# Patient Record
Sex: FEMALE | Race: BLACK OR AFRICAN AMERICAN | NOT HISPANIC OR LATINO | Employment: OTHER | URBAN - METROPOLITAN AREA
[De-identification: names, ages, dates, MRNs, and addresses within clinical notes are randomized per-mention and may not be internally consistent; named-entity substitution may affect disease eponyms.]

---

## 2017-02-13 ENCOUNTER — OFFICE VISIT (OUTPATIENT)
Dept: GASTROENTEROLOGY | Facility: CLINIC | Age: 42
End: 2017-02-13
Payer: MEDICAID

## 2017-02-13 VITALS
HEIGHT: 64 IN | SYSTOLIC BLOOD PRESSURE: 137 MMHG | WEIGHT: 237 LBS | DIASTOLIC BLOOD PRESSURE: 62 MMHG | BODY MASS INDEX: 40.46 KG/M2

## 2017-02-13 DIAGNOSIS — K31.84 GASTROPARESIS: Primary | ICD-10-CM

## 2017-02-13 DIAGNOSIS — K59.09 OTHER CONSTIPATION: ICD-10-CM

## 2017-02-13 PROCEDURE — 99212 OFFICE O/P EST SF 10 MIN: CPT | Mod: PBBFAC,PN | Performed by: INTERNAL MEDICINE

## 2017-02-13 PROCEDURE — 99999 PR PBB SHADOW E&M-EST. PATIENT-LVL II: CPT | Mod: PBBFAC,,, | Performed by: INTERNAL MEDICINE

## 2017-02-13 PROCEDURE — 99214 OFFICE O/P EST MOD 30 MIN: CPT | Mod: S$PBB,,, | Performed by: INTERNAL MEDICINE

## 2017-02-13 RX ORDER — LIDOCAINE 50 MG/G
OINTMENT TOPICAL
Refills: 0 | COMMUNITY
Start: 2016-12-29 | End: 2018-01-11

## 2017-02-13 RX ORDER — TRAZODONE HYDROCHLORIDE 50 MG/1
TABLET ORAL
COMMUNITY
Start: 2016-12-06 | End: 2018-01-11

## 2017-02-13 RX ORDER — CLOBETASOL PROPIONATE 0.5 MG/G
CREAM TOPICAL
Refills: 0 | COMMUNITY
Start: 2016-12-29 | End: 2018-01-11

## 2017-02-13 RX ORDER — NIFEDIPINE 30 MG/1
TABLET, EXTENDED RELEASE ORAL
COMMUNITY
Start: 2017-02-01 | End: 2018-01-11

## 2017-02-13 RX ORDER — HYDROCODONE BITARTRATE AND ACETAMINOPHEN 7.5; 325 MG/1; MG/1
TABLET ORAL
COMMUNITY
Start: 2017-01-16 | End: 2018-01-11

## 2017-02-13 RX ORDER — METOCLOPRAMIDE 10 MG/1
10 TABLET ORAL
Qty: 120 TABLET | Refills: 1 | Status: SHIPPED | OUTPATIENT
Start: 2017-02-13 | End: 2017-03-15

## 2017-02-13 RX ORDER — INSULIN GLARGINE 100 [IU]/ML
INJECTION, SOLUTION SUBCUTANEOUS
COMMUNITY
Start: 2016-12-06 | End: 2018-01-11

## 2017-02-13 NOTE — MR AVS SNAPSHOT
[facility]  [address]  [address]  [place] [address]  Phone: [phone]                  Osiel An Washington   2017 3:00 PM   Office Visit    Description:  Female : 1975   Provider:  Marcelina House MD   Department:  [place] [facility]           Reason for Visit     Follow-up           Diagnoses this Visit        Comments    Gastroparesis    -  Primary     Other constipation                To Do List           Goals (5 Years of Data)     None       These Medications        Disp Refills Start End    metoclopramide HCl (REGLAN) 10 MG tablet 120 tablet 1 2017 3/15/2017    Take 1 tablet (10 mg total) by mouth 4 (four) times daily before meals and nightly. - Oral    Pharmacy: [facility] [place], [place] [address] #: [phone]         [facility] On Call     [facility] On Call Nurse Care Line -  Assistance  Registered nurses in the [facility] On Call Center provide clinical advisement, health education, appointment booking, and other advisory services.  Call for this free service at [phone].             Medications           Message regarding Medications     Verify the changes and/or additions to your medication regime listed below are the same as discussed with your clinician today.  If any of these changes or additions are incorrect, please notify your healthcare provider.        START taking these NEW medications        Refills    metoclopramide HCl (REGLAN) 10 MG tablet 1    Sig: Take 1 tablet (10 mg total) by mouth 4 (four) times daily before meals and nightly.    Class: Normal    Route: Oral           Verify that the below list of medications is an accurate representation of the medications you are currently taking.  If none reported, the list may be blank. If incorrect, please contact your healthcare provider. Carry this list with you in case of emergency.           Current Medications      "cholestyramine-aspartame (CHOLESTYRAMINE LIGHT) 4 gram PwPk Take 1 packet (4 g total) by mouth 2 (two) times daily.    clobetasol (TEMOVATE) 0.05 % cream APPLY SMALL AMOUNT (1-2 GRAMS) TO AFFECTED AREA 2-4 TIMES DAILY AS DIRECTED FOR REDNESS RELIEF.    diphenoxylate-atropine 2.5-0.025 mg (LOMOTIL) 2.5-0.025 mg per tablet Take 1 tablet by mouth 4 (four) times daily as needed for Diarrhea.    gabapentin (NEURONTIN) 600 MG tablet Take 600 mg by mouth 3 (three) times daily.    hydrocodone-acetaminophen 7.5-325mg (NORCO) 7.5-325 mg per tablet     insulin NPH-insulin regular, 70/30, (NOVOLIN 70/30) 100 unit/mL (70-30) injection Inject 22 Units into the skin 2 (two) times daily before meals.    LANTUS 100 unit/mL injection     lidocaine (XYLOCAINE) 5 % Oint ointment APPLY 2-3GM TOPICALLY TO AFFECTED AREA  3 TIMES A DAY.    nifedipine (ADALAT CC) 30 MG TbSR Take 30 mg by mouth once daily. On non-dialysis days    nifedipine 30 MG ORAL TR24 (PROCARDIA-XL) 30 MG (OSM) 24 hr tablet     ondansetron (ZOFRAN-ODT) 4 MG TbDL Take 2 tablets (8 mg total) by mouth every 6 (six) hours as needed.    sertraline (ZOLOFT) 50 MG tablet Take 50 mg by mouth once daily.    sevelamer carbonate (RENVELA) 800 mg Tab Take 3,200 mg by mouth 3 (three) times daily with meals.    trazodone (DESYREL) 50 MG tablet     ULTICARE 1/2 mL 30 gauge x 1/2" Syrg     metoclopramide HCl (REGLAN) 10 MG tablet Take 1 tablet (10 mg total) by mouth 4 (four) times daily before meals and nightly.           Clinical Reference Information           Your Vitals Were     BP Height Weight Last Period BMI    137/62 5' 4" (1.626 m) 107.5 kg (237 lb) (LMP Unknown) 40.68 kg/m2      Blood Pressure          Most Recent Value    BP  137/62      Allergies as of 2/13/2017     Percocet [Oxycodone-acetaminophen]    Tramadol      Immunizations Administered on Date of Encounter - 2/13/2017     None      [facility] Sign-Up     Activating your [facility] account is as easy as 1-2-3!     1) " Visit [email or web address], select Sign Up Now, enter this activation code and your date of birth, then select Next.  [email or web address]  Expires: 3/30/2017  4:18 PM      2) Create a username and password to use when you visit [facility] in the future and select a security question in case you lose your password and select Next.    3) Enter your e-mail address and click Sign Up!    Additional Information  If you have questions, please e-mail [email or web address] or call [phone] to talk to our [employer] staff. Remember, [facility] is NOT to be used for urgent needs. For medical emergencies, dial 911.         Language Assistance Services     ATTENTION: Language assistance services are available, free of charge. Please call [phone].      ATENCIÓN: Si habla español, tiene a robertson disposición servicios gratuitos de asistencia lingüística. Llame al [phone].     CHÚ Ý: N?u b?n nói Ti?ng Vi?t, có các d?ch v? h? tr? ngôn ng? mi?n phí dành cho b?n. G?i s? [phone].         [facility] complies with applicable Federal civil rights laws and does not discriminate on the basis of race, color, national origin, age, disability, or sex.

## 2017-02-13 NOTE — PROGRESS NOTES
Subjective:       Patient ID: Osiel Moeller is a 42 y.o. female.    Chief Complaint: Follow-up (Gastroparesis)    HPI Patient was diagnosed with Gastroparesis in [facility] about 4 years ago. She described having a gastric emptying test done. She took Reglan 10 mg transiently but did not refill when she got to [place]. She also had a colonoscopy at the same time.  Today she is c/o constipation.  There is no abdominal pian. She continues to experience nausea and intermittent vomiting. There is no weight loss.    Review of Systems   Constitutional: Negative for appetite change.   HENT: Negative for trouble swallowing.    Eyes: Negative for photophobia.        Blind in both eyes     Respiratory: Negative for cough and shortness of breath.    Cardiovascular: Negative for palpitations.   Gastrointestinal:        See HPI for details   Genitourinary: Negative for frequency and hematuria.   Skin: Negative for rash.   Neurological: Negative for weakness and headaches.   Psychiatric/Behavioral: Negative for behavioral problems and suicidal ideas.       Objective:       Vitals:    02/13/17 1513   BP: 137/62       Physical Exam   Eyes: Pupils are equal, round, and reactive to light.   Neck: No thyromegaly present.   Cardiovascular: Normal rate, regular rhythm and normal heart sounds.    Pulmonary/Chest: Effort normal and breath sounds normal.   Abdominal: Soft. She exhibits no mass. There is no tenderness. There is no rebound and no guarding.   Musculoskeletal: She exhibits no tenderness.   Psychiatric: Her behavior is normal. Thought content normal.       Assessment:       1. Gastroparesis    2. Other constipation        Plan:       Osiel was seen today for follow-up.    Diagnoses and all orders for this visit:    Gastroparesis    Other constipation    Other orders  -     metoclopramide HCl (REGLAN) 10 MG tablet; Take 1 tablet (10 mg total) by mouth 4 (four) times daily before meals and  nightly.       Obtain medical record from last GI doctor in [place].    RTC in 1 month

## 2017-03-14 ENCOUNTER — HOSPITAL ENCOUNTER (EMERGENCY)
Facility: HOSPITAL | Age: 42
Discharge: HOME | End: 2017-03-15
Attending: EMERGENCY MEDICINE
Payer: MEDICAID

## 2017-03-14 DIAGNOSIS — G59 MONONEUROPATHY DUE TO UNDERLYING DISEASE: Primary | ICD-10-CM

## 2017-03-14 PROCEDURE — 99283 EMERGENCY DEPT VISIT LOW MDM: CPT | Mod: 25

## 2017-03-14 PROCEDURE — 96372 THER/PROPH/DIAG INJ SC/IM: CPT

## 2017-03-14 PROCEDURE — 82962 GLUCOSE BLOOD TEST: CPT

## 2017-03-14 NOTE — ED AVS SNAPSHOT
[facility] [address]  [place] [address]               Osiel An [place]   3/14/2017 11:57 PM   ED    Description:  Female : 1975   Department:  [facility]           Your Care was Coordinated By:     Provider Role From To    Altagracia Hernandez MD Attending Provider 17 [phone] --      Reason for Visit     Leg Pain           Diagnoses this Visit        Comments    Mononeuropathy due to underlying disease    -  Primary       ED Disposition     None           To Do List           Follow-up Information     Follow up with Moises Long MD. Schedule an appointment as soon as possible for a visit in 3 days.    Specialty:  Family Medicine    Contact information:    [facility]  [facility] [address]  [phone]        [facility] On Call     [facility] Nurse Care Line -  Assistance  Registered nurses in the [facility] Center provide clinical advisement, health education, appointment booking, and other advisory services.  Call for this free service at [phone].             Medications           Message regarding Medications     Verify the changes and/or additions to your medication regime listed below are the same as discussed with your clinician today.  If any of these changes or additions are incorrect, please notify your healthcare provider.        These medications were administered today        Dose Freq    morphine injection 6 mg 6 mg Once    Sig: Inject 3 mLs (6 mg total) into the muscle once.    Class: Normal    Route: Intramuscular           Verify that the below list of medications is an accurate representation of the medications you are currently taking.  If none reported, the list may be blank. If incorrect, please contact your healthcare provider. Carry this list with you in case of emergency.           Current Medications     cholestyramine-aspartame (CHOLESTYRAMINE LIGHT) 4 gram PwPk Take 1 packet  "(4 g total) by mouth 2 (two) times daily.    clobetasol (TEMOVATE) 0.05 % cream APPLY SMALL AMOUNT (1-2 GRAMS) TO AFFECTED AREA 2-4 TIMES DAILY AS DIRECTED FOR REDNESS RELIEF.    diphenoxylate-atropine 2.5-0.025 mg (LOMOTIL) 2.5-0.025 mg per tablet Take 1 tablet by mouth 4 (four) times daily as needed for Diarrhea.    gabapentin (NEURONTIN) 600 MG tablet Take 600 mg by mouth 3 (three) times daily.    hydrocodone-acetaminophen 7.5-325mg (NORCO) 7.5-325 mg per tablet     insulin NPH-insulin regular, 70/30, (NOVOLIN 70/30) 100 unit/mL (70-30) injection Inject 22 Units into the skin 2 (two) times daily before meals.    LANTUS 100 unit/mL injection     lidocaine (XYLOCAINE) 5 % Oint ointment APPLY 2-3GM TOPICALLY TO AFFECTED AREA  3 TIMES A DAY.    metoclopramide HCl (REGLAN) 10 MG tablet Take 1 tablet (10 mg total) by mouth 4 (four) times daily before meals and nightly.    morphine injection 6 mg Inject 3 mLs (6 mg total) into the muscle once.    nifedipine (ADALAT CC) 30 MG TbSR Take 30 mg by mouth once daily. On non-dialysis days    nifedipine 30 MG ORAL TR24 (PROCARDIA-XL) 30 MG (OSM) 24 hr tablet     ondansetron (ZOFRAN-ODT) 4 MG TbDL Take 2 tablets (8 mg total) by mouth every 6 (six) hours as needed.    sertraline (ZOLOFT) 50 MG tablet Take 50 mg by mouth once daily.    sevelamer carbonate (RENVELA) 800 mg Tab Take 3,200 mg by mouth 3 (three) times daily with meals.    trazodone (DESYREL) 50 MG tablet     ULTICARE 1/2 mL 30 gauge x 1/2" Syrg            Clinical Reference Information           Your Vitals Were     BP Pulse Temp Resp Height Weight    191/92 (BP Location: Left arm, Patient Position: Sitting, BP Method: Automatic) 85 98 °F (36.7 °C) (Oral) 19 5' 4" (1.626 m) 107.5 kg (237 lb)    Last Period SpO2 BMI          (LMP Unknown) 94% 40.68 kg/m2        Allergies as of 3/15/2017        Reactions    Percocet [Oxycodone-acetaminophen] Itching    Tramadol Nausea And Vomiting      Immunizations Administered on Date " of Encounter - 3/15/2017     None      ED Micro, Lab, POCT     Start Ordered       Status Ordering Provider    03/15/17 0027 03/15/17 0026  POCT glucose  Once      Final result       ED Imaging Orders     None        Discharge Instructions       YOU HAVE COMPLEX REGIONAL PAIN SYNDROME  YOU NEED TO FOLLOW UP WITH YOUR PAIN MANAGEMENT PHYSICIAN, DR MASSEY  PLEASE RETURN TO THE ER FOR WORSENING SYMPTOMS, FEVERS, CHILLS, NAUSEA OR VOMITING    Your Scheduled Appointments     Apr 04, 2017  1:00 PM CDT   Return with Shantanu Winter MD   [facility] ([facility])    [address] [place] [id number]   [phone]              [facility] Sign-Up     Activating your [facility] account is as easy as 1-2-3!     1) Visit [email or web address], select Sign Up Now, enter this activation code and your date of birth, then select Next.  [email or web address]  Expires: 3/30/2017  5:18 PM      2) Create a username and password to use when you visit [facility] in the future and select a security question in case you lose your password and select Next.    3) Enter your e-mail address and click Sign Up!    Additional Information  If you have questions, please e-mail [email or web address] or call [phone] to talk to our [facility] staff. Remember, [facility] is NOT to be used for urgent needs. For medical emergencies, dial 911.          [facility] complies with applicable Federal civil rights laws and does not discriminate on the basis of race, color, national origin, age, disability, or sex.        Language Assistance Services     ATTENTION: Language assistance services are available, free of charge. Please call [phone].      ATENCIÓN: Si habla español, tiene a robertson disposición servicios gratuitos de asistencia lingüística. Llame al [phone].     CHÚ Ý: N?u b?n nói Ti?ng Vi?t, có các d?ch v? h? tr? ngôn ng? mi?n phí dành cho b?n. G?i s? [phone].

## 2017-03-15 VITALS
SYSTOLIC BLOOD PRESSURE: 191 MMHG | RESPIRATION RATE: 19 BRPM | HEIGHT: 64 IN | DIASTOLIC BLOOD PRESSURE: 92 MMHG | BODY MASS INDEX: 40.46 KG/M2 | HEART RATE: 85 BPM | WEIGHT: 237 LBS | TEMPERATURE: 98 F | OXYGEN SATURATION: 94 %

## 2017-03-15 LAB
GLUCOSE SERPL-MCNC: 245 MG/DL (ref 70–110)
POCT GLUCOSE: 254 MG/DL (ref 70–110)

## 2017-03-15 PROCEDURE — 63600175 PHARM REV CODE 636 W HCPCS: Performed by: EMERGENCY MEDICINE

## 2017-03-15 RX ORDER — MORPHINE SULFATE 2 MG/ML
6 INJECTION, SOLUTION INTRAMUSCULAR; INTRAVENOUS ONCE
Status: COMPLETED | OUTPATIENT
Start: 2017-03-15 | End: 2017-03-15

## 2017-03-15 RX ADMIN — MORPHINE SULFATE 6 MG: 2 INJECTION, SOLUTION INTRAMUSCULAR; INTRAVENOUS at 01:03

## 2017-03-15 NOTE — ED NOTES
Pt presents today for chronic Right leg pain that has gotten worse over the last few days. Pain is described as aching and tingling. Pt denies injury. Pt denies relief with gabapentin and numbing cream.  Pt states that she usually takes hydrocodone acetaminophen but is currently out and has a f/u with her pain specialist tomorrow at 1400.

## 2017-03-15 NOTE — ED PROVIDER NOTES
Encounter Date: 3/14/2017       History     Chief Complaint   Patient presents with    Leg Pain     c/o neuropathy pain in rt. leg from foot to knee. pt. is on neurontin and topical cream but not helping tonight. pt. has appt. with pain management md tomorrow after dialysis but requesting medication to relieve pain tonight.  report hydrocodone helps but she is out.      Review of patient's allergies indicates:   Allergen Reactions    Percocet [oxycodone-acetaminophen] Itching    Tramadol Nausea And Vomiting     HPI Comments: 42-year-old female with a history of diabetes, peripheral neuropathy, here with left leg pain aching burning radiating from her foot up to her calf.  Denies any shortness of breath difficulty breathing.  She has had symptoms like this many times before, but normally is able to break it with combination of her gabapentin cream and Vicodin.  She has run out of her Vicodin.  She has a pain management appointment with Dr. Hummel tomorrow.    Patient is a 42 y.o. female presenting with the following complaint: leg pain. The history is provided by the patient and the spouse.   Leg Pain    There was no injury mechanism. The incident occurred today. The pain is present in the left leg. The quality of the pain is described as aching. The pain is at a severity of 3/10. The pain has been constant since onset. Pertinent negatives include no numbness and no inability to bear weight. She has tried elevation for the symptoms. The treatment provided moderate relief.     Past Medical History:   Diagnosis Date    Blind     CHF (congestive heart failure)     Coronary artery disease     Diabetes mellitus     Hypertension     Positive D dimer     Renal failure, chronic      Past Surgical History:   Procedure Laterality Date     SECTION      x3    CHOLECYSTECTOMY  2009    DIALYSIS FISTULA CREATION Right     upper arm    RETINAL DETACHMENT SURGERY      TUBAL LIGATION      VASCULAR SURGERY  Bilateral     vein surgery     Family History   Problem Relation Age of Onset    Heart disease Mother     Hyperlipidemia Mother     Hypertension Mother     Heart disease Father     Hypertension Father      Social History   Substance Use Topics    Smoking status: Never Smoker    Smokeless tobacco: Not on file    Alcohol use No     Review of Systems   Eyes: Negative.    Respiratory: Negative.    Endocrine: Negative.    Neurological: Negative for numbness.   All other systems reviewed and are negative.      Physical Exam   Initial Vitals   BP Pulse Resp Temp SpO2   03/14/17 2330 03/14/17 2330 03/14/17 2330 03/14/17 2330 03/14/17 2330   201/100 87 20 98.5 °F (36.9 °C) 98 %     Physical Exam    Nursing note and vitals reviewed.  Constitutional: She appears well-nourished. She appears distressed.   Chronically ill appearing   HENT:   Head: Normocephalic and atraumatic.   Eyes: EOM are normal. Pupils are equal, round, and reactive to light.   Neck: Normal range of motion. Neck supple.   Cardiovascular: Normal rate and normal heart sounds.   Pulmonary/Chest: Breath sounds normal.   Abdominal: Soft.   Musculoskeletal: Normal range of motion.   Neurological: She is alert and oriented to person, place, and time. She has normal strength. No cranial nerve deficit.   Skin: Skin is warm and dry.   Chronic skin changes of both legs, nothing different from her baseline.  No evidence of erythema or cellulitis she does have a lack of hair, consistent with likely CRPS   Psychiatric: She has a normal mood and affect. Thought content normal.         ED Course   Procedures  Labs Reviewed   POCT GLUCOSE MONITORING CONTINUOUS - Abnormal; Notable for the following:        Result Value    POC Glucose 245 (*)     All other components within normal limits             Medical Decision Making:   Initial Assessment:   41 yo diabetic with multiple medical problems here with flair of pain in heel, foot and calf  Differential Diagnosis:    CRPS, inadequate pain control, peripheral neuropathy  ED Management:  Patient given shot of morphine  F/u with pain management tomorrow                   ED Course     Clinical Impression:   The encounter diagnosis was Mononeuropathy due to underlying disease.          Altagracia Hernandez MD  03/15/17 0155

## 2017-03-15 NOTE — DISCHARGE INSTRUCTIONS
YOU HAVE COMPLEX REGIONAL PAIN SYNDROME  YOU NEED TO FOLLOW UP WITH YOUR PAIN MANAGEMENT PHYSICIAN, DR MASSEY  PLEASE RETURN TO THE ER FOR WORSENING SYMPTOMS, FEVERS, CHILLS, NAUSEA OR VOMITING

## 2017-11-06 ENCOUNTER — OFFICE VISIT (OUTPATIENT)
Dept: GASTROENTEROLOGY | Facility: CLINIC | Age: 42
End: 2017-11-06
Payer: MEDICAID

## 2017-11-06 VITALS
DIASTOLIC BLOOD PRESSURE: 81 MMHG | HEIGHT: 64 IN | WEIGHT: 237 LBS | SYSTOLIC BLOOD PRESSURE: 138 MMHG | BODY MASS INDEX: 40.46 KG/M2

## 2017-11-06 DIAGNOSIS — K52.9 CHRONIC DIARRHEA: Primary | ICD-10-CM

## 2017-11-06 DIAGNOSIS — R10.13 EPIGASTRIC PAIN: ICD-10-CM

## 2017-11-06 DIAGNOSIS — I10 ESSENTIAL HYPERTENSION: ICD-10-CM

## 2017-11-06 DIAGNOSIS — E13.9 DIABETES 1.5, MANAGED AS TYPE 1: ICD-10-CM

## 2017-11-06 DIAGNOSIS — R11.2 NON-INTRACTABLE VOMITING WITH NAUSEA, UNSPECIFIED VOMITING TYPE: ICD-10-CM

## 2017-11-06 DIAGNOSIS — R06.00 DYSPNEA, UNSPECIFIED TYPE: ICD-10-CM

## 2017-11-06 DIAGNOSIS — I50.9 CHRONIC CONGESTIVE HEART FAILURE, UNSPECIFIED CONGESTIVE HEART FAILURE TYPE: ICD-10-CM

## 2017-11-06 DIAGNOSIS — N18.6 ESRD (END STAGE RENAL DISEASE): ICD-10-CM

## 2017-11-06 DIAGNOSIS — R00.2 PALPITATIONS: ICD-10-CM

## 2017-11-06 DIAGNOSIS — R11.2 INTRACTABLE VOMITING WITH NAUSEA, UNSPECIFIED VOMITING TYPE: ICD-10-CM

## 2017-11-06 PROCEDURE — 99999 PR PBB SHADOW E&M-EST. PATIENT-LVL II: CPT | Mod: PBBFAC,,, | Performed by: INTERNAL MEDICINE

## 2017-11-06 PROCEDURE — 99212 OFFICE O/P EST SF 10 MIN: CPT | Mod: PBBFAC,PN | Performed by: INTERNAL MEDICINE

## 2017-11-06 PROCEDURE — 99214 OFFICE O/P EST MOD 30 MIN: CPT | Mod: S$PBB,,, | Performed by: INTERNAL MEDICINE

## 2017-11-06 RX ORDER — ONDANSETRON 4 MG/1
8 TABLET, ORALLY DISINTEGRATING ORAL EVERY 6 HOURS PRN
Qty: 90 TABLET | Refills: 0 | Status: ON HOLD | OUTPATIENT
Start: 2017-11-06 | End: 2018-03-12 | Stop reason: HOSPADM

## 2017-11-06 RX ORDER — SERTRALINE HYDROCHLORIDE 50 MG/1
50 TABLET, FILM COATED ORAL DAILY
Qty: 20 TABLET | Refills: 1 | Status: ON HOLD | OUTPATIENT
Start: 2017-11-06 | End: 2018-03-12 | Stop reason: HOSPADM

## 2017-11-06 NOTE — PROGRESS NOTES
Subjective:       Patient ID: Osiel Mo[place] is a 42 y.o. female.    Chief Complaint: Diarrhea    Diarrhea    This is a new problem. The current episode started 1 to 4 weeks ago. The problem occurs 5 to 10 times per day. Associated symptoms include abdominal pain. Pertinent negatives include no coughing or headaches. Nothing aggravates the symptoms. She has tried anti-motility drug for the symptoms. The treatment provided no relief.   The diarrhea is slowly improving. There is no blood in stool. Multiple family members also have diarrhea.    Review of Systems   Constitutional: Negative for appetite change.   HENT: Negative for trouble swallowing.    Eyes: Negative for photophobia.   Respiratory: Negative for cough and shortness of breath.    Cardiovascular: Negative for palpitations.   Gastrointestinal: Positive for abdominal pain and diarrhea.        See HPI for details   Genitourinary: Negative for frequency and hematuria.   Skin: Negative for rash.   Neurological: Negative for weakness and headaches.   Psychiatric/Behavioral: Negative for behavioral problems and suicidal ideas.       Objective:      Physical Exam   Eyes: Pupils are equal, round, and reactive to light.   Neck: No thyromegaly present.   Cardiovascular: Normal rate, regular rhythm and normal heart sounds.    Pulmonary/Chest: Effort normal and breath sounds normal.   Abdominal: Soft. She exhibits no mass. There is no tenderness. There is no rebound and no guarding.   Musculoskeletal: She exhibits no tenderness.   Psychiatric: Her behavior is normal. Thought content normal.       Assessment:       1. Chronic diarrhea    2. Intractable vomiting with nausea, unspecified vomiting type        Plan:       Osiel was seen today for diarrhea.    Diagnoses and all orders for this visit:    Chronic diarrhea    Intractable vomiting with nausea, unspecified vomiting type    Other orders  Osiel was seen today for diarrhea.    Diagnoses and all  orders for this visit:    Chronic diarrhea  -     Stool culture; Future  -     Giardia / Cryptosporidum, EIA; Future  -     Stool Exam-Ova,Cysts,Parasites; Future  -     WBC, Stool; Future  -     Clostridium difficile EIA; Future    Intractable vomiting with nausea, unspecified vomiting type    Epigastric pain  -     ondansetron (ZOFRAN-ODT) 4 MG TbDL; Take 2 tablets (8 mg total) by mouth every 6 (six) hours as needed.    Non-intractable vomiting with nausea, unspecified vomiting type  -     ondansetron (ZOFRAN-ODT) 4 MG TbDL; Take 2 tablets (8 mg total) by mouth every 6 (six) hours as needed.

## 2017-11-09 ENCOUNTER — LAB VISIT (OUTPATIENT)
Dept: LAB | Facility: HOSPITAL | Age: 42
End: 2017-11-09
Attending: INTERNAL MEDICINE
Payer: MEDICAID

## 2017-11-09 DIAGNOSIS — K52.9 CHRONIC DIARRHEA: ICD-10-CM

## 2017-11-09 PROCEDURE — 89055 LEUKOCYTE ASSESSMENT FECAL: CPT

## 2017-11-09 PROCEDURE — 87209 SMEAR COMPLEX STAIN: CPT

## 2017-11-09 PROCEDURE — 87329 GIARDIA AG IA: CPT

## 2017-11-10 LAB
CRYPTOSP AG STL QL IA: NEGATIVE
G LAMBLIA AG STL QL IA: NEGATIVE
O+P STL TRI STN: NORMAL
WBC #/AREA STL HPF: NORMAL /[HPF]

## 2017-11-14 ENCOUNTER — TELEPHONE (OUTPATIENT)
Dept: GASTROENTEROLOGY | Facility: CLINIC | Age: 42
End: 2017-11-14

## 2017-11-14 NOTE — TELEPHONE ENCOUNTER
Left message for patient to return call to office.    ----- Message from Marcelina House MD sent at 11/14/2017 10:06 AM CST -----  Stool studies are negative

## 2018-01-11 ENCOUNTER — HOSPITAL ENCOUNTER (EMERGENCY)
Facility: HOSPITAL | Age: 43
Discharge: HOME | End: 2018-01-11
Attending: FAMILY MEDICINE
Payer: MEDICAID

## 2018-01-11 VITALS
SYSTOLIC BLOOD PRESSURE: 200 MMHG | WEIGHT: 199.5 LBS | DIASTOLIC BLOOD PRESSURE: 95 MMHG | HEART RATE: 88 BPM | RESPIRATION RATE: 20 BRPM | BODY MASS INDEX: 34.25 KG/M2 | TEMPERATURE: 98 F | OXYGEN SATURATION: 100 %

## 2018-01-11 DIAGNOSIS — K52.9 GASTROENTERITIS, ACUTE: Primary | ICD-10-CM

## 2018-01-11 LAB
FLUAV AG SPEC QL IA: NEGATIVE
FLUBV AG SPEC QL IA: NEGATIVE
SPECIMEN SOURCE: NORMAL

## 2018-01-11 PROCEDURE — 87400 INFLUENZA A/B EACH AG IA: CPT

## 2018-01-11 PROCEDURE — 25000003 PHARM REV CODE 250: Performed by: FAMILY MEDICINE

## 2018-01-11 PROCEDURE — 99283 EMERGENCY DEPT VISIT LOW MDM: CPT

## 2018-01-11 RX ORDER — NIFEDIPINE 60 MG/1
60 TABLET, EXTENDED RELEASE ORAL DAILY
Status: ON HOLD | COMMUNITY
Start: 2017-10-10 | End: 2018-03-12 | Stop reason: HOSPADM

## 2018-01-11 RX ORDER — HYDROXYZINE PAMOATE 25 MG/1
25 CAPSULE ORAL
Status: COMPLETED | OUTPATIENT
Start: 2018-01-11 | End: 2018-01-11

## 2018-01-11 RX ORDER — PROMETHAZINE HYDROCHLORIDE 25 MG/1
25 TABLET ORAL EVERY 6 HOURS PRN
Qty: 15 TABLET | Refills: 0 | Status: ON HOLD | OUTPATIENT
Start: 2018-01-11 | End: 2018-03-12 | Stop reason: HOSPADM

## 2018-01-11 RX ORDER — HYDROXYZINE HYDROCHLORIDE 25 MG/1
25 TABLET, FILM COATED ORAL EVERY 6 HOURS
Qty: 20 TABLET | Refills: 0 | Status: ON HOLD | OUTPATIENT
Start: 2018-01-11 | End: 2018-03-12 | Stop reason: HOSPADM

## 2018-01-11 RX ORDER — HYDROCODONE BITARTRATE AND ACETAMINOPHEN 5; 325 MG/1; MG/1
1 TABLET ORAL
Status: COMPLETED | OUTPATIENT
Start: 2018-01-11 | End: 2018-01-11

## 2018-01-11 RX ORDER — ONDANSETRON 4 MG/1
4 TABLET, ORALLY DISINTEGRATING ORAL
Status: COMPLETED | OUTPATIENT
Start: 2018-01-11 | End: 2018-01-11

## 2018-01-11 RX ADMIN — ONDANSETRON 4 MG: 4 TABLET, ORALLY DISINTEGRATING ORAL at 02:01

## 2018-01-11 RX ADMIN — HYDROCODONE BITARTRATE AND ACETAMINOPHEN 1 TABLET: 5; 325 TABLET ORAL at 02:01

## 2018-01-11 RX ADMIN — HYDROXYZINE PAMOATE 25 MG: 25 CAPSULE ORAL at 02:01

## 2018-01-11 NOTE — ED NOTES
"1st encounter, patient sitting up at side of bed, c/o N/V/D and generalized body aches as well as C/O bilateral foot pain secondary to neuropathy and lower back pain.  Patient also states she is itching "all over" and Benadryl is not helping.  Last HD yesterday, NAD noted, respirations even/unlabored.   "

## 2018-01-11 NOTE — ED PROVIDER NOTES
Encounter Date: 2018       History     Chief Complaint   Patient presents with    Vomiting     vomiting, nausea, body aches with itching for a few days    Nausea    Generalized Body Aches     Patient complains of nausea and vomiting which started yesterday.  Did not take any medication.  Diffuse body aches.  Patient also complains of headache.  Denies any chest pain or shortness of breath.  Patient isn't known ESRD on dialysis had her last dialysis on Wednesday.  Patient denies any fever or cough.  Patient has diffuse itching of her body which is a chronic symptom took Benadryl did not make her any better.  No rash.      The history is provided by the patient.     Review of patient's allergies indicates:   Allergen Reactions    Tramadol Nausea And Vomiting     Past Medical History:   Diagnosis Date    Blind     CHF (congestive heart failure)     Coronary artery disease     Depression     Diabetes mellitus     Heart murmur     Hypertension     Positive D dimer     Renal failure, chronic      Past Surgical History:   Procedure Laterality Date     SECTION      x3    CHOLECYSTECTOMY  2009    DIALYSIS FISTULA CREATION Right     upper arm    RETINAL DETACHMENT SURGERY      TUBAL LIGATION      VASCULAR SURGERY Bilateral     vein surgery     Family History   Problem Relation Age of Onset    Heart disease Mother     Hyperlipidemia Mother     Hypertension Mother     Heart disease Father     Hypertension Father      Social History   Substance Use Topics    Smoking status: Never Smoker    Smokeless tobacco: Not on file    Alcohol use No     Review of Systems   Constitutional: Negative for activity change, appetite change and fever.   HENT: Negative for congestion, rhinorrhea, sinus pressure and sore throat.    Eyes: Negative for pain, discharge and itching.   Respiratory: Negative for cough, chest tightness, shortness of breath and wheezing.    Cardiovascular: Negative for chest pain and  palpitations.   Gastrointestinal: Negative for abdominal pain, diarrhea, nausea and vomiting.   Genitourinary: Negative for dysuria and enuresis.   Musculoskeletal: Negative for neck pain and neck stiffness.   Skin: Negative for rash.   Neurological: Negative for dizziness, tremors, weakness, light-headedness, numbness and headaches.   Psychiatric/Behavioral: Negative for confusion and hallucinations. The patient is not nervous/anxious.    All other systems reviewed and are negative.      Physical Exam     Initial Vitals [01/11/18 0148]   BP Pulse Resp Temp SpO2   (!) 208/95 86 20 98.4 °F (36.9 °C) 100 %      MAP       132.67         Physical Exam    Nursing note and vitals reviewed.  Constitutional: She appears well-developed and well-nourished.   HENT:   Head: Normocephalic.   Cardiovascular: Normal rate, regular rhythm, normal heart sounds and intact distal pulses.         ED Course   Procedures  Labs Reviewed   INFLUENZA A AND B ANTIGEN             Medical Decision Making:   ED Management:  Patient also became from [place] [facility] since there is awaiting..  Patient says somebody told to come here for a checkup.  On arrival to ED patient is not throwing up activity.  Comfortable sitting in the bed and talking on phone.  On examination patient abdomen is asymptomatic.  Patient is given nausea medication and also pain medication for her headache and body aches.  Patient is given Vistaril for itching.  She requested a prescription was given on discharge.  Patient is advised to follow-up with the primary care physician if symptoms persist.  Or to the ER if symptoms are worsening.                   ED Course      Clinical Impression:   The encounter diagnosis was Gastroenteritis, acute.    Disposition:   Disposition: Discharged  Condition: Lico Jesus MD  01/11/18 3127

## 2018-03-05 ENCOUNTER — HOSPITAL ENCOUNTER (INPATIENT)
Facility: HOSPITAL | Age: 43
LOS: 7 days | Discharge: HOSPICE | DRG: 871 | End: 2018-03-12
Attending: EMERGENCY MEDICINE | Admitting: INTERNAL MEDICINE
Payer: MEDICAID

## 2018-03-05 DIAGNOSIS — Z51.5 PALLIATIVE CARE ENCOUNTER: ICD-10-CM

## 2018-03-05 DIAGNOSIS — H54.7 VISION LOSS: ICD-10-CM

## 2018-03-05 DIAGNOSIS — J96.02 ACUTE RESPIRATORY FAILURE WITH HYPOXIA AND HYPERCAPNIA: Primary | ICD-10-CM

## 2018-03-05 DIAGNOSIS — Z51.5 COMFORT MEASURES ONLY STATUS: ICD-10-CM

## 2018-03-05 DIAGNOSIS — E13.9 DIABETES 1.5, MANAGED AS TYPE 1: ICD-10-CM

## 2018-03-05 DIAGNOSIS — I46.9 CARDIAC ARREST: ICD-10-CM

## 2018-03-05 DIAGNOSIS — J96.01 ACUTE RESPIRATORY FAILURE WITH HYPOXIA AND HYPERCAPNIA: Primary | ICD-10-CM

## 2018-03-05 DIAGNOSIS — J69.0 ASPIRATION PNEUMONIA OF RIGHT UPPER LOBE, UNSPECIFIED ASPIRATION PNEUMONIA TYPE: ICD-10-CM

## 2018-03-05 DIAGNOSIS — G93.40 ENCEPHALOPATHY: ICD-10-CM

## 2018-03-05 DIAGNOSIS — Z99.2 ESRD ON DIALYSIS: ICD-10-CM

## 2018-03-05 DIAGNOSIS — N18.6 ESRD ON DIALYSIS: ICD-10-CM

## 2018-03-05 DIAGNOSIS — N18.6 ESRD (END STAGE RENAL DISEASE): ICD-10-CM

## 2018-03-05 LAB
ALBUMIN SERPL BCP-MCNC: 3.5 G/DL
ALLENS TEST: ABNORMAL
ALLENS TEST: ABNORMAL
ALP SERPL-CCNC: 140 U/L
ALT SERPL W/O P-5'-P-CCNC: 121 U/L
ANION GAP SERPL CALC-SCNC: 25 MMOL/L
ANISOCYTOSIS BLD QL SMEAR: SLIGHT
AST SERPL-CCNC: 173 U/L
BACTERIA #/AREA URNS HPF: ABNORMAL /HPF
BACTERIA #/AREA URNS HPF: ABNORMAL /HPF
BASOPHILS # BLD AUTO: 0.05 K/UL
BASOPHILS NFR BLD: 0.3 %
BILIRUB SERPL-MCNC: 0.5 MG/DL
BILIRUB UR QL STRIP: NEGATIVE
BILIRUB UR QL STRIP: NEGATIVE
BNP SERPL-MCNC: 892 PG/ML
BUN SERPL-MCNC: 58 MG/DL
CALCIUM SERPL-MCNC: 10.3 MG/DL
CHLORIDE SERPL-SCNC: 104 MMOL/L
CK SERPL-CCNC: 99 U/L
CLARITY UR: ABNORMAL
CLARITY UR: CLEAR
CO2 SERPL-SCNC: 17 MMOL/L
COLOR UR: YELLOW
COLOR UR: YELLOW
CREAT SERPL-MCNC: 9.8 MG/DL
DACRYOCYTES BLD QL SMEAR: ABNORMAL
DELSYS: ABNORMAL
DELSYS: ABNORMAL
DIFFERENTIAL METHOD: ABNORMAL
EOSINOPHIL # BLD AUTO: 0.5 K/UL
EOSINOPHIL NFR BLD: 2.7 %
ERYTHROCYTE [DISTWIDTH] IN BLOOD BY AUTOMATED COUNT: 19.4 %
ERYTHROCYTE [SEDIMENTATION RATE] IN BLOOD BY WESTERGREN METHOD: 16 MM/H
ERYTHROCYTE [SEDIMENTATION RATE] IN BLOOD BY WESTERGREN METHOD: 20 MM/H
EST. GFR  (AFRICAN AMERICAN): 5 ML/MIN/1.73 M^2
EST. GFR  (NON AFRICAN AMERICAN): 4 ML/MIN/1.73 M^2
FIO2: 100
FIO2: 100
GLUCOSE SERPL-MCNC: 256 MG/DL
GLUCOSE UR QL STRIP: ABNORMAL
GLUCOSE UR QL STRIP: ABNORMAL
HCO3 UR-SCNC: 21 MMOL/L (ref 24–28)
HCO3 UR-SCNC: 32.8 MMOL/L (ref 24–28)
HCT VFR BLD AUTO: 29.7 %
HCT VFR BLD CALC: 33 %PCV (ref 36–54)
HGB BLD-MCNC: 11 G/DL
HGB BLD-MCNC: 9.2 G/DL
HGB UR QL STRIP: ABNORMAL
HGB UR QL STRIP: ABNORMAL
HYALINE CASTS #/AREA URNS LPF: 0 /LPF
HYALINE CASTS #/AREA URNS LPF: 1 /LPF
HYPOCHROMIA BLD QL SMEAR: ABNORMAL
KETONES UR QL STRIP: NEGATIVE
KETONES UR QL STRIP: NEGATIVE
LACTATE SERPL-SCNC: 7.5 MMOL/L
LEUKOCYTE ESTERASE UR QL STRIP: ABNORMAL
LEUKOCYTE ESTERASE UR QL STRIP: NEGATIVE
LYMPHOCYTES # BLD AUTO: 4.4 K/UL
LYMPHOCYTES NFR BLD: 24.4 %
MAGNESIUM SERPL-MCNC: 2.6 MG/DL
MCH RBC QN AUTO: 28.5 PG
MCHC RBC AUTO-ENTMCNC: 31 G/DL
MCV RBC AUTO: 92 FL
MICROSCOPIC COMMENT: ABNORMAL
MICROSCOPIC COMMENT: ABNORMAL
MODE: ABNORMAL
MODE: ABNORMAL
MONOCYTES # BLD AUTO: 0.6 K/UL
MONOCYTES NFR BLD: 3.1 %
NEUTROPHILS # BLD AUTO: 12.2 K/UL
NEUTROPHILS NFR BLD: 69.5 %
NITRITE UR QL STRIP: NEGATIVE
NITRITE UR QL STRIP: NEGATIVE
NON-SQ EPI CELLS #/AREA URNS HPF: 2 /HPF
OVALOCYTES BLD QL SMEAR: ABNORMAL
PCO2 BLDA: 49.7 MMHG (ref 35–45)
PCO2 BLDA: 73.1 MMHG (ref 35–45)
PEEP: 5
PEEP: 5
PH SMN: 7.07 [PH] (ref 7.35–7.45)
PH SMN: 7.43 [PH] (ref 7.35–7.45)
PH UR STRIP: 7 [PH] (ref 5–8)
PH UR STRIP: 8 [PH] (ref 5–8)
PLATELET # BLD AUTO: 274 K/UL
PLATELET BLD QL SMEAR: ABNORMAL
PMV BLD AUTO: 9.6 FL
PO2 BLDA: 639 MMHG (ref 80–100)
PO2 BLDA: 73 MMHG (ref 40–60)
POC BE: -9 MMOL/L
POC BE: 8 MMOL/L
POC IONIZED CALCIUM: 1.3 MMOL/L (ref 1.06–1.42)
POC SATURATED O2: 100 % (ref 95–100)
POC SATURATED O2: 86 % (ref 95–100)
POC TCO2: 23 MMOL/L (ref 24–29)
POC TCO2: 34 MMOL/L (ref 23–27)
POCT GLUCOSE: 307 MG/DL (ref 70–110)
POIKILOCYTOSIS BLD QL SMEAR: SLIGHT
POLYCHROMASIA BLD QL SMEAR: ABNORMAL
POTASSIUM BLD-SCNC: 4.9 MMOL/L (ref 3.5–5.1)
POTASSIUM SERPL-SCNC: 5.2 MMOL/L
PROT SERPL-MCNC: 8.3 G/DL
PROT UR QL STRIP: ABNORMAL
PROT UR QL STRIP: ABNORMAL
RBC # BLD AUTO: 3.23 M/UL
RBC #/AREA URNS HPF: 20 /HPF (ref 0–4)
RBC #/AREA URNS HPF: 30 /HPF (ref 0–4)
SAMPLE: ABNORMAL
SAMPLE: ABNORMAL
SCHISTOCYTES BLD QL SMEAR: PRESENT
SITE: ABNORMAL
SODIUM BLD-SCNC: 143 MMOL/L (ref 136–145)
SODIUM SERPL-SCNC: 146 MMOL/L
SP GR UR STRIP: 1.01 (ref 1–1.03)
SP GR UR STRIP: 1.01 (ref 1–1.03)
SQUAMOUS #/AREA URNS HPF: 6 /HPF
TARGETS BLD QL SMEAR: ABNORMAL
TROPONIN I SERPL DL<=0.01 NG/ML-MCNC: 0.06 NG/ML
URN SPEC COLLECT METH UR: ABNORMAL
URN SPEC COLLECT METH UR: ABNORMAL
UROBILINOGEN UR STRIP-ACNC: NEGATIVE EU/DL
UROBILINOGEN UR STRIP-ACNC: NEGATIVE EU/DL
VT: 450
VT: 450
WBC # BLD AUTO: 17.93 K/UL
WBC #/AREA URNS HPF: 10 /HPF (ref 0–5)
WBC #/AREA URNS HPF: 2 /HPF (ref 0–5)
WBC CLUMPS URNS QL MICRO: ABNORMAL
YEAST URNS QL MICRO: ABNORMAL

## 2018-03-05 PROCEDURE — 63600175 PHARM REV CODE 636 W HCPCS: Performed by: STUDENT IN AN ORGANIZED HEALTH CARE EDUCATION/TRAINING PROGRAM

## 2018-03-05 PROCEDURE — 81000 URINALYSIS NONAUTO W/SCOPE: CPT

## 2018-03-05 PROCEDURE — 20000000 HC ICU ROOM

## 2018-03-05 PROCEDURE — 85025 COMPLETE CBC W/AUTO DIFF WBC: CPT

## 2018-03-05 PROCEDURE — 93005 ELECTROCARDIOGRAM TRACING: CPT

## 2018-03-05 PROCEDURE — 87086 URINE CULTURE/COLONY COUNT: CPT

## 2018-03-05 PROCEDURE — 84132 ASSAY OF SERUM POTASSIUM: CPT

## 2018-03-05 PROCEDURE — 82803 BLOOD GASES ANY COMBINATION: CPT

## 2018-03-05 PROCEDURE — 83735 ASSAY OF MAGNESIUM: CPT

## 2018-03-05 PROCEDURE — 80048 BASIC METABOLIC PNL TOTAL CA: CPT

## 2018-03-05 PROCEDURE — 25000003 PHARM REV CODE 250: Performed by: EMERGENCY MEDICINE

## 2018-03-05 PROCEDURE — 63600175 PHARM REV CODE 636 W HCPCS: Performed by: EMERGENCY MEDICINE

## 2018-03-05 PROCEDURE — 99291 CRITICAL CARE FIRST HOUR: CPT | Mod: 25

## 2018-03-05 PROCEDURE — 93010 ELECTROCARDIOGRAM REPORT: CPT | Mod: S$GLB,,, | Performed by: INTERNAL MEDICINE

## 2018-03-05 PROCEDURE — 81000 URINALYSIS NONAUTO W/SCOPE: CPT | Mod: 91

## 2018-03-05 PROCEDURE — 83520 IMMUNOASSAY QUANT NOS NONAB: CPT

## 2018-03-05 PROCEDURE — 96365 THER/PROPH/DIAG IV INF INIT: CPT

## 2018-03-05 PROCEDURE — 83735 ASSAY OF MAGNESIUM: CPT | Mod: 91

## 2018-03-05 PROCEDURE — 96375 TX/PRO/DX INJ NEW DRUG ADDON: CPT

## 2018-03-05 PROCEDURE — 94002 VENT MGMT INPAT INIT DAY: CPT

## 2018-03-05 PROCEDURE — 82550 ASSAY OF CK (CPK): CPT

## 2018-03-05 PROCEDURE — 83605 ASSAY OF LACTIC ACID: CPT

## 2018-03-05 PROCEDURE — 5A1945Z RESPIRATORY VENTILATION, 24-96 CONSECUTIVE HOURS: ICD-10-PCS | Performed by: INTERNAL MEDICINE

## 2018-03-05 PROCEDURE — 82550 ASSAY OF CK (CPK): CPT | Mod: 91

## 2018-03-05 PROCEDURE — 80053 COMPREHEN METABOLIC PANEL: CPT

## 2018-03-05 PROCEDURE — 36415 COLL VENOUS BLD VENIPUNCTURE: CPT

## 2018-03-05 PROCEDURE — 84100 ASSAY OF PHOSPHORUS: CPT

## 2018-03-05 PROCEDURE — 36600 WITHDRAWAL OF ARTERIAL BLOOD: CPT

## 2018-03-05 PROCEDURE — 99900035 HC TECH TIME PER 15 MIN (STAT)

## 2018-03-05 PROCEDURE — 84484 ASSAY OF TROPONIN QUANT: CPT

## 2018-03-05 PROCEDURE — 87040 BLOOD CULTURE FOR BACTERIA: CPT

## 2018-03-05 PROCEDURE — 25000003 PHARM REV CODE 250: Performed by: STUDENT IN AN ORGANIZED HEALTH CARE EDUCATION/TRAINING PROGRAM

## 2018-03-05 PROCEDURE — 84484 ASSAY OF TROPONIN QUANT: CPT | Mod: 91

## 2018-03-05 PROCEDURE — 83880 ASSAY OF NATRIURETIC PEPTIDE: CPT

## 2018-03-05 RX ORDER — IBUPROFEN 200 MG
16 TABLET ORAL
Status: DISCONTINUED | OUTPATIENT
Start: 2018-03-05 | End: 2018-03-12 | Stop reason: HOSPADM

## 2018-03-05 RX ORDER — ACETAMINOPHEN 650 MG/20.3ML
650 LIQUID ORAL EVERY 6 HOURS
Status: DISCONTINUED | OUTPATIENT
Start: 2018-03-06 | End: 2018-03-08

## 2018-03-05 RX ORDER — SODIUM CHLORIDE 0.9 % (FLUSH) 0.9 %
5 SYRINGE (ML) INJECTION
Status: DISCONTINUED | OUTPATIENT
Start: 2018-03-05 | End: 2018-03-12 | Stop reason: HOSPADM

## 2018-03-05 RX ORDER — GLUCAGON 1 MG
1 KIT INJECTION
Status: DISCONTINUED | OUTPATIENT
Start: 2018-03-05 | End: 2018-03-08 | Stop reason: SDUPTHER

## 2018-03-05 RX ORDER — IBUPROFEN 200 MG
24 TABLET ORAL
Status: DISCONTINUED | OUTPATIENT
Start: 2018-03-05 | End: 2018-03-12 | Stop reason: HOSPADM

## 2018-03-05 RX ORDER — PROPOFOL 10 MG/ML
20 INJECTION, EMULSION INTRAVENOUS CONTINUOUS
Status: DISCONTINUED | OUTPATIENT
Start: 2018-03-05 | End: 2018-03-10

## 2018-03-05 RX ORDER — SODIUM BICARBONATE 1 MEQ/ML
50 SYRINGE (ML) INTRAVENOUS
Status: COMPLETED | OUTPATIENT
Start: 2018-03-05 | End: 2018-03-05

## 2018-03-05 RX ORDER — INSULIN ASPART 100 [IU]/ML
0-5 INJECTION, SOLUTION INTRAVENOUS; SUBCUTANEOUS
Status: DISCONTINUED | OUTPATIENT
Start: 2018-03-05 | End: 2018-03-08 | Stop reason: SDUPTHER

## 2018-03-05 RX ORDER — BUSPIRONE HYDROCHLORIDE 5 MG/1
30 TABLET ORAL ONCE
Status: COMPLETED | OUTPATIENT
Start: 2018-03-05 | End: 2018-03-05

## 2018-03-05 RX ORDER — DEXTROSE 50 % IN WATER (D50W) INTRAVENOUS SYRINGE
Status: COMPLETED
Start: 2018-03-05 | End: 2018-03-07

## 2018-03-05 RX ORDER — MAGNESIUM SULFATE HEPTAHYDRATE 40 MG/ML
2 INJECTION, SOLUTION INTRAVENOUS
Status: COMPLETED | OUTPATIENT
Start: 2018-03-05 | End: 2018-03-06

## 2018-03-05 RX ADMIN — SODIUM BICARBONATE 50 MEQ: 84 INJECTION, SOLUTION INTRAVENOUS at 06:03

## 2018-03-05 RX ADMIN — PIPERACILLIN AND TAZOBACTAM 4.5 G: 4; .5 INJECTION, POWDER, LYOPHILIZED, FOR SOLUTION INTRAVENOUS; PARENTERAL at 06:03

## 2018-03-05 RX ADMIN — VANCOMYCIN HYDROCHLORIDE 1000 MG: 1 INJECTION, POWDER, LYOPHILIZED, FOR SOLUTION INTRAVENOUS at 08:03

## 2018-03-05 RX ADMIN — PROPOFOL 10 MCG/KG/MIN: 10 INJECTION, EMULSION INTRAVENOUS at 09:03

## 2018-03-05 RX ADMIN — INSULIN ASPART 2 UNITS: 100 INJECTION, SOLUTION INTRAVENOUS; SUBCUTANEOUS at 10:03

## 2018-03-05 RX ADMIN — ACETAMINOPHEN 650 MG: 650 SOLUTION ORAL at 11:03

## 2018-03-05 RX ADMIN — BUSPIRONE HYDROCHLORIDE 30 MG: 5 TABLET ORAL at 09:03

## 2018-03-05 NOTE — ED PROVIDER NOTES
Encounter Date: 3/5/2018    SCRIBE #1 NOTE: I, Sue Sandoval, am scribing for, and in the presence of,  Dr. Clifford. I have scribed the entire note.       History   No chief complaint on file.    Time seen by provider: 5:04 PM    This is a 43 y.o. female with HTN, CAD, DM, CHF and ESRD who presents with cardiac arrest. As per EMS the patient's symptoms began prior to arrival. EMS note the family had an episode of vomiting prior to passing out. Per EMS the patient's family was performing CPR prior to arrival on scene for approximately 10 minutes. The patient was then intubated via EMS, though EMS suspect the patient may have aspirated as they had to clear the airway. She was then given 1 Bicarb, 1 calcium chloride and 4 epinephrine with return of pulse. As per EMS the patient appeared to be in V-tac and in an effort to decrease rate synchronized shocked the patient 3 times. The patient cardioverted after a fourth shock as per EMS. Of note, EMS are uncertain if the patient has been compliant with dialysis          The history is provided by the EMS personnel and a relative. The history is limited by the condition of the patient.     Review of patient's allergies indicates:   Allergen Reactions    Tramadol Nausea And Vomiting     Past Medical History:   Diagnosis Date    Blind     CHF (congestive heart failure)     Coronary artery disease     Depression     Diabetes mellitus     Heart murmur     Hypertension     Positive D dimer     Renal failure, chronic      Past Surgical History:   Procedure Laterality Date     SECTION      x3    CHOLECYSTECTOMY  2009    DIALYSIS FISTULA CREATION Right     upper arm    RETINAL DETACHMENT SURGERY      TUBAL LIGATION      VASCULAR SURGERY Bilateral     vein surgery     Family History   Problem Relation Age of Onset    Heart disease Mother     Hyperlipidemia Mother     Hypertension Mother     Heart disease Father     Hypertension Father      Social  History   Substance Use Topics    Smoking status: Never Smoker    Smokeless tobacco: Not on file    Alcohol use No     Review of Systems   Unable to perform ROS: Acuity of condition       Physical Exam     Initial Vitals   BP Pulse Resp Temp SpO2   -- -- -- -- --      MAP       --         Physical Exam    Nursing note and vitals reviewed.  Constitutional: She is not diaphoretic.   HENT:   Head: Normocephalic and atraumatic.   Eyes:   Right cornea clouding. Left pupil is dilated and non-reactive   Neck: Neck supple.   Cardiovascular: Normal rate and regular rhythm. Exam reveals no gallop and no friction rub.    No murmur heard.  Pulmonary/Chest: She has no wheezes. She has no rales.   Equal breath sounds with bagging   Abdominal: Soft. She exhibits no distension.   Musculoskeletal: She exhibits edema.   Trace edema of BLE.  Dialysis acces of the right upper arm with palpable thrill.   Skin: Skin is warm and dry. No rash noted.         ED Course   Critical Care  Date/Time: 3/5/2018 5:56 PM  Performed by: TIMOTHY COLLEIR  Authorized by: TIMOTHY COLLIER   Direct patient critical care time: 15 minutes  Additional history critical care time: 10 minutes  Ordering / reviewing critical care time: 5 minutes  Documentation critical care time: 10 minutes  Consulting other physicians critical care time: 5 minutes  Total critical care time (exclusive of procedural time) : 45 minutes  Critical care time was exclusive of teaching time and separately billable procedures and treating other patients.  Critical care was necessary to treat or prevent imminent or life-threatening deterioration of the following conditions: cardiac failure.  Critical care was time spent personally by me on the following activities: discussions with consultants, interpretation of cardiac output measurements, evaluation of patient's response to treatment, examination of patient, obtaining history from patient or surrogate, ordering and  performing treatments and interventions, ordering and review of laboratory studies, ordering and review of radiographic studies, re-evaluation of patient's condition, review of old charts and pulse oximetry.        Labs Reviewed - No data to display  EKG Readings: (Independently Interpreted)   Normal sinus rhythm with a rate of 92. Prolonged QT. No ST elevations. No T wave changes.      Imaging Results          X-Ray Chest 1 View (Final result)  Result time 03/05/18 17:45:18    Final result by Tarun Albert MD (03/05/18 17:45:18)                 Impression:     Interval development of consolidation in the right upper lung zone that could relate to aspiration, pneumonia, or atelectasis. Correlation and further evaluation advised.      Electronically signed by: TARUN ALBERT MD  Date:     03/05/18  Time:    17:45              Narrative:    Cardiac arrest.    Comparison: 12/21/16.    Single frontal view the chest was obtained.    There is a right chest single lumen Port-A-Cath the tip of which appears projected over the right chest. The patient is intubated. The endotracheal tube is above the level of the babak. The trachea is patent. The cardiac silhouette appears normal in size. There has been interval development of consolidation involving the entire right upper lung zone with ipsilateral mediastinal shift. Left lung is well-expanded and clear. There is no effusion or pneumothorax. No free air below the diaphragm. NG tube is seen with tip in the upper abdomen region. There is a stent in the right subclavian region. No acute osseous abnormality. TIPS is in the right upper quadrant.                                 Medical Decision Making:   Clinical Tests:   Lab Tests: Ordered and Reviewed  Radiological Study: Ordered and Reviewed  Medical Tests: Ordered and Reviewed  ED Management:  5:53 PM Case discussed with [facility], will come to evaluate and admit the patient                      Clinical  Impression:     1. Aspiration pneumonia of right upper lobe, unspecified aspiration pneumonia type    2. Cardiac arrest    3. ESRD (end stage renal disease)        Disposition:   Disposition: Placed in Observation  Condition: Serious    I, Dr. Rome Clifford, personally performed the services described in this documentation. All medical record entries made by the scribe were at my direction and in my presence. I have reviewed the chart and agree that the record reflects my personal performance and is accurate and complete. Rome Clifford MD.  6:05 PM 03/05/2018                     Rome Clifford MD  03/05/18 8620

## 2018-03-05 NOTE — LETTER
March 8, 2018    Renetta An  [address]  [place] [address]             [facility]  [facility] [place]  [address]  Phone: [phone]  Fax: [phone] March 8, 2018     Patient: Osiel Moeller   YOB: 1975       To Whom It May Concern:    Osiel Moeller was admitted to the hospital on 3/5/2018  5:03 PM. She remains critically ill in the ICU.  Please consider allowing Mr. Yates to visit his mother at [facility] in [place], [place] if possible.     Sincerely,    Luis Antonio Bliss, DO  Department of Hospital Medicine

## 2018-03-05 NOTE — ED NOTES
Pt was at a residence and had a witnessed arrest after n/v x 1 episode. Bystander CPR was initiated immediately. Initial rhythm on ems arrival was PEA. ACLS protocol was initiated. 7.0 ETT placed with BVM in progress. Left humeral I/O was obtained.  External compression device was used until ROSC was obtained. Pt was given 4 rounds of epi, bicarb, calcium, 3 synchronized shocks, and 150 of amio. Pt arrived on spine board and foam rolls.

## 2018-03-05 NOTE — ED NOTES
Pads placed on pt. Patient on cardiac monitor, automatic blood pressure cuff and pulse oximeter.

## 2018-03-06 LAB
ALLENS TEST: ABNORMAL
ANION GAP SERPL CALC-SCNC: 13 MMOL/L
ANION GAP SERPL CALC-SCNC: 16 MMOL/L
ANION GAP SERPL CALC-SCNC: 17 MMOL/L
ANION GAP SERPL CALC-SCNC: 18 MMOL/L
ANION GAP SERPL CALC-SCNC: 18 MMOL/L
APTT BLDCRRT: 24 SEC
APTT BLDCRRT: 25.2 SEC
APTT BLDCRRT: 45 SEC
BASOPHILS # BLD AUTO: 0 K/UL
BASOPHILS # BLD AUTO: 0.01 K/UL
BASOPHILS NFR BLD: 0 %
BASOPHILS NFR BLD: 0.1 %
BUN SERPL-MCNC: 42 MG/DL
BUN SERPL-MCNC: 65 MG/DL
BUN SERPL-MCNC: 69 MG/DL
BUN SERPL-MCNC: 70 MG/DL
BUN SERPL-MCNC: 74 MG/DL
CALCIUM SERPL-MCNC: 9.4 MG/DL
CALCIUM SERPL-MCNC: 9.4 MG/DL
CALCIUM SERPL-MCNC: 9.5 MG/DL
CALCIUM SERPL-MCNC: 9.6 MG/DL
CALCIUM SERPL-MCNC: 9.7 MG/DL
CHLORIDE SERPL-SCNC: 100 MMOL/L
CHLORIDE SERPL-SCNC: 100 MMOL/L
CHLORIDE SERPL-SCNC: 103 MMOL/L
CHLORIDE SERPL-SCNC: 105 MMOL/L
CHLORIDE SERPL-SCNC: 105 MMOL/L
CK SERPL-CCNC: 104 U/L
CO2 SERPL-SCNC: 22 MMOL/L
CO2 SERPL-SCNC: 23 MMOL/L
CO2 SERPL-SCNC: 23 MMOL/L
CO2 SERPL-SCNC: 24 MMOL/L
CO2 SERPL-SCNC: 25 MMOL/L
CREAT SERPL-MCNC: 10 MG/DL
CREAT SERPL-MCNC: 10.1 MG/DL
CREAT SERPL-MCNC: 6.2 MG/DL
CREAT SERPL-MCNC: 9.8 MG/DL
CREAT SERPL-MCNC: 9.8 MG/DL
DELSYS: ABNORMAL
DIASTOLIC DYSFUNCTION: YES
DIFFERENTIAL METHOD: ABNORMAL
DIFFERENTIAL METHOD: ABNORMAL
EOSINOPHIL # BLD AUTO: 0 K/UL
EOSINOPHIL # BLD AUTO: 0 K/UL
EOSINOPHIL NFR BLD: 0 %
EOSINOPHIL NFR BLD: 0.1 %
ERYTHROCYTE [DISTWIDTH] IN BLOOD BY AUTOMATED COUNT: 18.7 %
ERYTHROCYTE [DISTWIDTH] IN BLOOD BY AUTOMATED COUNT: 19 %
ERYTHROCYTE [SEDIMENTATION RATE] IN BLOOD BY WESTERGREN METHOD: 20 MM/H
EST. GFR  (AFRICAN AMERICAN): 5 ML/MIN/1.73 M^2
EST. GFR  (AFRICAN AMERICAN): 9 ML/MIN/1.73 M^2
EST. GFR  (NON AFRICAN AMERICAN): 4 ML/MIN/1.73 M^2
EST. GFR  (NON AFRICAN AMERICAN): 8 ML/MIN/1.73 M^2
ESTIMATED AVG GLUCOSE: 128 MG/DL
ESTIMATED PA SYSTOLIC PRESSURE: 46.14
FIO2: 60
GLUCOSE SERPL-MCNC: 203 MG/DL
GLUCOSE SERPL-MCNC: 203 MG/DL
GLUCOSE SERPL-MCNC: 262 MG/DL
GLUCOSE SERPL-MCNC: 262 MG/DL
GLUCOSE SERPL-MCNC: 290 MG/DL
GLUCOSE SERPL-MCNC: 290 MG/DL
GLUCOSE SERPL-MCNC: 298 MG/DL
GLUCOSE SERPL-MCNC: 298 MG/DL
GLUCOSE SERPL-MCNC: 311 MG/DL
GLUCOSE SERPL-MCNC: 311 MG/DL
HBA1C MFR BLD HPLC: 6.1 %
HCO3 UR-SCNC: 28.9 MMOL/L (ref 24–28)
HCT VFR BLD AUTO: 27.3 %
HCT VFR BLD AUTO: 30.8 %
HGB BLD-MCNC: 8.8 G/DL
HGB BLD-MCNC: 9.9 G/DL
INR PPP: 1
INR PPP: 1
LACTATE SERPL-SCNC: 2.4 MMOL/L
LYMPHOCYTES # BLD AUTO: 0.5 K/UL
LYMPHOCYTES # BLD AUTO: 0.5 K/UL
LYMPHOCYTES NFR BLD: 3.8 %
LYMPHOCYTES NFR BLD: 4.3 %
MAGNESIUM SERPL-MCNC: 2.4 MG/DL
MAGNESIUM SERPL-MCNC: 2.5 MG/DL
MAGNESIUM SERPL-MCNC: 2.6 MG/DL
MAGNESIUM SERPL-MCNC: 2.9 MG/DL
MAGNESIUM SERPL-MCNC: 2.9 MG/DL
MCH RBC QN AUTO: 28 PG
MCH RBC QN AUTO: 28.5 PG
MCHC RBC AUTO-ENTMCNC: 32.1 G/DL
MCHC RBC AUTO-ENTMCNC: 32.2 G/DL
MCV RBC AUTO: 87 FL
MCV RBC AUTO: 88 FL
MITRAL VALVE REGURGITATION: ABNORMAL
MODE: ABNORMAL
MONOCYTES # BLD AUTO: 0.4 K/UL
MONOCYTES # BLD AUTO: 0.5 K/UL
MONOCYTES NFR BLD: 2.7 %
MONOCYTES NFR BLD: 4.3 %
NEUTROPHILS # BLD AUTO: 10.1 K/UL
NEUTROPHILS # BLD AUTO: 12.5 K/UL
NEUTROPHILS NFR BLD: 91.4 %
NEUTROPHILS NFR BLD: 93.3 %
PCO2 BLDA: 37.3 MMHG (ref 35–45)
PEEP: 5
PH SMN: 7.5 [PH] (ref 7.35–7.45)
PHOSPHATE SERPL-MCNC: 4.6 MG/DL
PHOSPHATE SERPL-MCNC: 7 MG/DL
PHOSPHATE SERPL-MCNC: 7.2 MG/DL
PHOSPHATE SERPL-MCNC: 7.3 MG/DL
PLATELET # BLD AUTO: 227 K/UL
PLATELET # BLD AUTO: 234 K/UL
PLATELET # BLD AUTO: 234 K/UL
PMV BLD AUTO: 10.2 FL
PMV BLD AUTO: 10.2 FL
PMV BLD AUTO: 10.3 FL
PO2 BLDA: 164 MMHG (ref 80–100)
POC BE: 6 MMOL/L
POC SATURATED O2: 100 % (ref 95–100)
POC TCO2: 30 MMOL/L (ref 23–27)
POCT GLUCOSE: 138 MG/DL (ref 70–110)
POCT GLUCOSE: 145 MG/DL (ref 70–110)
POCT GLUCOSE: 172 MG/DL (ref 70–110)
POCT GLUCOSE: 201 MG/DL (ref 70–110)
POCT GLUCOSE: 221 MG/DL (ref 70–110)
POCT GLUCOSE: 222 MG/DL (ref 70–110)
POCT GLUCOSE: 236 MG/DL (ref 70–110)
POCT GLUCOSE: 254 MG/DL (ref 70–110)
POTASSIUM SERPL-SCNC: 4.3 MMOL/L
POTASSIUM SERPL-SCNC: 4.5 MMOL/L
POTASSIUM SERPL-SCNC: 4.7 MMOL/L
POTASSIUM SERPL-SCNC: 5.2 MMOL/L
POTASSIUM SERPL-SCNC: 5.3 MMOL/L
POTASSIUM SERPL-SCNC: 5.6 MMOL/L
PROTHROMBIN TIME: 10.9 SEC
PROTHROMBIN TIME: 11 SEC
RBC # BLD AUTO: 3.09 M/UL
RBC # BLD AUTO: 3.53 M/UL
RETIRED EF AND QEF - SEE NOTES: 50 (ref 55–65)
SAMPLE: ABNORMAL
SITE: ABNORMAL
SODIUM SERPL-SCNC: 138 MMOL/L
SODIUM SERPL-SCNC: 141 MMOL/L
SODIUM SERPL-SCNC: 144 MMOL/L
SODIUM SERPL-SCNC: 144 MMOL/L
SODIUM SERPL-SCNC: 145 MMOL/L
TROPONIN I SERPL DL<=0.01 NG/ML-MCNC: 0.34 NG/ML
TROPONIN I SERPL DL<=0.01 NG/ML-MCNC: 0.38 NG/ML
VANCOMYCIN SERPL-MCNC: 15.5 UG/ML
VT: 450
WBC # BLD AUTO: 11.05 K/UL
WBC # BLD AUTO: 13.36 K/UL

## 2018-03-06 PROCEDURE — 36415 COLL VENOUS BLD VENIPUNCTURE: CPT

## 2018-03-06 PROCEDURE — 80100016 HC MAINTENANCE HEMODIALYSIS

## 2018-03-06 PROCEDURE — 87340 HEPATITIS B SURFACE AG IA: CPT

## 2018-03-06 PROCEDURE — 36600 WITHDRAWAL OF ARTERIAL BLOOD: CPT

## 2018-03-06 PROCEDURE — 83605 ASSAY OF LACTIC ACID: CPT

## 2018-03-06 PROCEDURE — 94003 VENT MGMT INPAT SUBQ DAY: CPT

## 2018-03-06 PROCEDURE — 25000003 PHARM REV CODE 250: Performed by: STUDENT IN AN ORGANIZED HEALTH CARE EDUCATION/TRAINING PROGRAM

## 2018-03-06 PROCEDURE — 85730 THROMBOPLASTIN TIME PARTIAL: CPT

## 2018-03-06 PROCEDURE — 63600175 PHARM REV CODE 636 W HCPCS: Performed by: STUDENT IN AN ORGANIZED HEALTH CARE EDUCATION/TRAINING PROGRAM

## 2018-03-06 PROCEDURE — 85025 COMPLETE CBC W/AUTO DIFF WBC: CPT

## 2018-03-06 PROCEDURE — 84484 ASSAY OF TROPONIN QUANT: CPT

## 2018-03-06 PROCEDURE — 84100 ASSAY OF PHOSPHORUS: CPT

## 2018-03-06 PROCEDURE — 20000000 HC ICU ROOM

## 2018-03-06 PROCEDURE — 93306 TTE W/DOPPLER COMPLETE: CPT

## 2018-03-06 PROCEDURE — 85730 THROMBOPLASTIN TIME PARTIAL: CPT | Mod: 91

## 2018-03-06 PROCEDURE — 83036 HEMOGLOBIN GLYCOSYLATED A1C: CPT

## 2018-03-06 PROCEDURE — S0028 INJECTION, FAMOTIDINE, 20 MG: HCPCS | Performed by: INTERNAL MEDICINE

## 2018-03-06 PROCEDURE — 85610 PROTHROMBIN TIME: CPT | Mod: 91

## 2018-03-06 PROCEDURE — 80202 ASSAY OF VANCOMYCIN: CPT

## 2018-03-06 PROCEDURE — 86706 HEP B SURFACE ANTIBODY: CPT

## 2018-03-06 PROCEDURE — 84132 ASSAY OF SERUM POTASSIUM: CPT

## 2018-03-06 PROCEDURE — 94761 N-INVAS EAR/PLS OXIMETRY MLT: CPT

## 2018-03-06 PROCEDURE — 99199 UNLISTED SPECIAL SVC PX/RPRT: CPT

## 2018-03-06 PROCEDURE — 25000003 PHARM REV CODE 250: Performed by: INTERNAL MEDICINE

## 2018-03-06 PROCEDURE — 99900035 HC TECH TIME PER 15 MIN (STAT)

## 2018-03-06 PROCEDURE — 80048 BASIC METABOLIC PNL TOTAL CA: CPT | Mod: 91

## 2018-03-06 PROCEDURE — 83735 ASSAY OF MAGNESIUM: CPT | Mod: 91

## 2018-03-06 RX ORDER — FAMOTIDINE 10 MG/ML
20 INJECTION INTRAVENOUS 2 TIMES DAILY
Status: DISCONTINUED | OUTPATIENT
Start: 2018-03-06 | End: 2018-03-06 | Stop reason: DRUGHIGH

## 2018-03-06 RX ORDER — LABETALOL HYDROCHLORIDE 5 MG/ML
5 INJECTION, SOLUTION INTRAVENOUS ONCE
Status: COMPLETED | OUTPATIENT
Start: 2018-03-06 | End: 2018-03-06

## 2018-03-06 RX ORDER — SODIUM CHLORIDE 9 MG/ML
INJECTION, SOLUTION INTRAVENOUS
Status: DISCONTINUED | OUTPATIENT
Start: 2018-03-06 | End: 2018-03-12 | Stop reason: HOSPADM

## 2018-03-06 RX ORDER — SODIUM CHLORIDE 9 MG/ML
INJECTION, SOLUTION INTRAVENOUS ONCE
Status: DISCONTINUED | OUTPATIENT
Start: 2018-03-06 | End: 2018-03-08 | Stop reason: SDUPTHER

## 2018-03-06 RX ORDER — HEPARIN SODIUM,PORCINE/D5W 25000/250
16 INTRAVENOUS SOLUTION INTRAVENOUS CONTINUOUS
Status: DISCONTINUED | OUTPATIENT
Start: 2018-03-06 | End: 2018-03-10

## 2018-03-06 RX ORDER — SODIUM CHLORIDE 9 MG/ML
INJECTION, SOLUTION INTRAVENOUS CONTINUOUS
Status: DISCONTINUED | OUTPATIENT
Start: 2018-03-06 | End: 2018-03-06

## 2018-03-06 RX ORDER — FAMOTIDINE 10 MG/ML
20 INJECTION INTRAVENOUS DAILY
Status: DISCONTINUED | OUTPATIENT
Start: 2018-03-06 | End: 2018-03-12 | Stop reason: HOSPADM

## 2018-03-06 RX ADMIN — PROPOFOL 20 MCG/KG/MIN: 10 INJECTION, EMULSION INTRAVENOUS at 06:03

## 2018-03-06 RX ADMIN — LABETALOL HYDROCHLORIDE 5 MG: 5 INJECTION, SOLUTION INTRAVENOUS at 03:03

## 2018-03-06 RX ADMIN — HEPARIN SODIUM AND DEXTROSE 16 UNITS/KG/HR: 10000; 5 INJECTION INTRAVENOUS at 02:03

## 2018-03-06 RX ADMIN — MAGNESIUM SULFATE HEPTAHYDRATE 2 G: 40 INJECTION, SOLUTION INTRAVENOUS at 01:03

## 2018-03-06 RX ADMIN — ACETAMINOPHEN 650 MG: 650 SOLUTION ORAL at 11:03

## 2018-03-06 RX ADMIN — PROPOFOL 20 MCG/KG/MIN: 10 INJECTION, EMULSION INTRAVENOUS at 08:03

## 2018-03-06 RX ADMIN — SODIUM CHLORIDE 5 UNITS/HR: 9 INJECTION, SOLUTION INTRAVENOUS at 04:03

## 2018-03-06 RX ADMIN — FAMOTIDINE 20 MG: 10 INJECTION, SOLUTION INTRAVENOUS at 01:03

## 2018-03-06 RX ADMIN — PROPOFOL 20 MCG/KG/MIN: 10 INJECTION, EMULSION INTRAVENOUS at 05:03

## 2018-03-06 RX ADMIN — PROPOFOL 20 MCG/KG/MIN: 10 INJECTION, EMULSION INTRAVENOUS at 02:03

## 2018-03-06 RX ADMIN — PIPERACILLIN AND TAZOBACTAM 4.5 G: 4; .5 INJECTION, POWDER, LYOPHILIZED, FOR SOLUTION INTRAVENOUS; PARENTERAL at 06:03

## 2018-03-06 RX ADMIN — PIPERACILLIN AND TAZOBACTAM 4.5 G: 4; .5 INJECTION, POWDER, LYOPHILIZED, FOR SOLUTION INTRAVENOUS; PARENTERAL at 05:03

## 2018-03-06 RX ADMIN — AZITHROMYCIN MONOHYDRATE 500 MG: 500 INJECTION, POWDER, LYOPHILIZED, FOR SOLUTION INTRAVENOUS at 04:03

## 2018-03-06 RX ADMIN — INSULIN ASPART 3 UNITS: 100 INJECTION, SOLUTION INTRAVENOUS; SUBCUTANEOUS at 02:03

## 2018-03-06 RX ADMIN — ACETAMINOPHEN 650 MG: 650 SOLUTION ORAL at 05:03

## 2018-03-06 NOTE — PLAN OF CARE
Problem: Hemodialysis (Adult)  Goal: Signs and Symptoms of Listed Potential Problems Will be Absent, Minimized or Managed (Hemodialysis)  Signs and symptoms of listed potential problems will be absent, minimized or managed by discharge/transition of care (reference Hemodialysis (Adult) CPG).   Outcome: Ongoing (interventions implemented as appropriate)   03/06/18 1608   Hemodialysis   Problems Assessed (Hemodialysis) electrolyte imbalance;fluid imbalance   Problems Present (Hemodialysis) electrolyte imbalance;fluid imbalance   Hd with uf

## 2018-03-06 NOTE — PROGRESS NOTES
Pharmacist Renal Dose Adjustment Note    Osiel Moeller is a 43 y.o. female being treated with the medication Famotidine        Recent Labs     Lab 03/05/18  2239 03/06/18  0244 03/06/18  0709   CREATININE 9.8* 9.8* 10.0*     Serum creatinine: 10 mg/dL (H) 03/06/18 0709  Estimated creatinine clearance: 8.7 mL/min (A)    Medication:Famotidine dose: 20 mg frequency BID will be changed to medication:Famotidine dose:20 mg frequency daily     Pharmacist's Name: Deirdre Romo  Pharmacist's Extension: [phone]

## 2018-03-06 NOTE — PROGRESS NOTES
03/05/18 2245   Vital Signs   Pulse 75   Resp 20   Resp Rate Total 20 br/min   SpO2 100 %   Oxygen Concentration (%) 60   BP (!) 190/94   MAP (mmHg) 129     Dr. Robert notified of pt's blood pressure. Will put in order for medicine or change parameters. No phone orders received at this time

## 2018-03-06 NOTE — MEDICAL/APP STUDENT
[facility]  Pulmonology  H&P    Patient Name: Osiel Moeller  Admission Date: 3/5/2018  Code Status: Full Code  Principal Problem: Cardiac Arrest    Subjective:     HPI: Osiel Moeller is a 44 y/o  female with PMH significant for HTN, Dm2 (with retinopathy-blindness), HFpEF, CAD, and ESRD on HD (MWF, last session possibly 18) who presented to the ED per EMS on 2018 after cardiac arrest. History obtained from chart review s/t patient's condition. Per family, patient missed HD the day of presentation to attend a , and was later found that same day unresponsive in her bed and covered in vomit. Pt's family started CPR for approximately 10 minutes till EMS arrived. Per EMS, patient was in PEA upon their arrival for which she was given Epi x4, calcium, bicarb, and glucose; rhythm changed to v-tach and she was shocked x4 before ROSC was achieved. The patient was intubated in the field per EMS, and EMS suspected that she may have aspirated as they had to clear the airway. Upon arrival to the ED, she was hypothermic (temp. 32.4) with dilated pupils and occasional myoclonic jerks. She was started on propofol for sedation and TTM for goal temp of 34 degrees.    The pulmonary team has been consulted for critical care, TTM, and ventilator management.       Past Medical History:   Diagnosis Date    Blind     CHF (congestive heart failure)     Coronary artery disease     Depression     Diabetes mellitus     Heart murmur     Hypertension     ESRD- on HD MWF        Past Surgical History:   Procedure Laterality Date     SECTION      x3    CHOLECYSTECTOMY  2009    DIALYSIS FISTULA CREATION Right     upper arm    RETINAL DETACHMENT SURGERY      TUBAL LIGATION      VASCULAR SURGERY Bilateral     vein surgery       Review of patient's allergies indicates:   Allergen Reactions    Tramadol Nausea And Vomiting     Family History     Problem Relation  "(Age of Onset)    Heart disease Mother, Father    Hyperlipidemia Mother    Hypertension Mother, Father        Social History Main Topics    Smoking status: Never Smoker    Smokeless tobacco: Not on file    Alcohol use No    Drug use: Unknown    Sexual activity: No       Current Outpatient Prescriptions on File Prior to Encounter   Medication Sig Dispense Refill    diphenoxylate-atropine 2.5-0.025 mg (LOMOTIL) 2.5-0.025 mg per tablet Take 1 tablet by mouth 4 (four) times daily as needed for Diarrhea.      gabapentin (NEURONTIN) 600 MG tablet Take 600 mg by mouth 3 (three) times daily.      hydrOXYzine HCl (ATARAX) 25 MG tablet Take 1 tablet (25 mg total) by mouth every 6 (six) hours. 20 tablet 0    NIFEdipine (PROCARDIA-XL) 60 MG (OSM) 24 hr tablet Take 60 mg by mouth once daily. Take on non-dialysis days only      ondansetron (ZOFRAN-ODT) 4 MG TbDL Take 2 tablets (8 mg total) by mouth every 6 (six) hours as needed. 90 tablet 0    promethazine (PHENERGAN) 25 MG tablet Take 1 tablet (25 mg total) by mouth every 6 (six) hours as needed for Nausea. 15 tablet 0    sertraline (ZOLOFT) 50 MG tablet Take 1 tablet (50 mg total) by mouth once daily. 20 tablet 1    sevelamer carbonate (RENVELA) 800 mg Tab Take 3,200 mg by mouth 3 (three) times daily with meals.      ULTICARE 1/2 mL 30 gauge x 1/2" Syrg        Current Hospital Medications  Scheduled Meds:   sodium chloride 0.9%   Intravenous Once    acetaminophen  650 mg Per NG tube Q6H    azithromycin  500 mg Intravenous Q24H    famotidine (PF)  20 mg Intravenous Daily    piperacillin-tazobactam (ZOSYN) IVPB  4.5 g Intravenous Q12H    sodium polystyrene  15 g Oral Once     Continuous Infusions:   heparin (porcine) in D5W 16 Units/kg/hr (03/06/18 1411)    insulin (HUMAN R) infusion (adults)  5 units/hr     propofol 20 mcg/kg/min (03/06/18 1433)     PRN Meds:.sodium chloride 0.9%, dextrose 50%, dextrose 50%, glucagon (human recombinant), glucose, glucose, " "heparin (PORCINE), heparin (PORCINE), insulin aspart U-100, sodium chloride 0.9%    Review of Systems   Unable to perform ROS: Patient unresponsive     Objective:     Vital Signs (Most Recent):  Temp: (!) 95.2 °F (35.1 °C) (03/06/18 0600)  Pulse: 72 (03/06/18 0930)  Resp: (!) 28 (03/06/18 0930)  BP: (!) 157/85 (03/06/18 0930)  SpO2: 99 % (03/06/18 0930) Vital Signs (24h Range):  Temp:  [89.4 °F (31.9 °C)-96.5 °F (35.8 °C)] 95.2 °F (35.1 °C)  Pulse:  [] 72  Resp:  [11-28] 28  SpO2:  [99 %-100 %] 99 %  BP: (151-207)/() 157/85     Weight: 102.1 kg (225 lb 1.4 oz)  Body mass index is 35.25 kg/m².  Height: 5'7"    Intake/Output Summary (Last 24 hours) at 03/06/18 1413  Last data filed at 03/06/18 1100   Gross per 24 hour   Intake           528.58 ml   Output              292 ml   Net           236.58 ml       Physical Exam   Constitutional: She is sedated and intubated.   Eyes: Left pupil is not reactive. Pupils are unequal.   Right eye irregular and cloudy; unable to visualize pupil   Neck: Neck supple. No tracheal deviation present.   AMANDA JVD s/t body habitus   Cardiovascular: Normal rate, regular rhythm, S1 normal, S2 normal and normal heart sounds.    No murmur heard.  Pulses:       Radial pulses are 2+ on the right side, and 2+ on the left side.        Dorsalis pedis pulses are 1+ on the right side, and 2+ on the left side.        Posterior tibial pulses are 1+ on the right side, and 1+ on the left side.   Pulmonary/Chest: She is intubated. She has decreased breath sounds in the right lower field and the left lower field. She has rhonchi in the right upper field and the right middle field.   Abdominal: Soft. Bowel sounds are decreased.   Musculoskeletal:   Right upper arm fistula positive for thrill and bruit.  No spontaneous movements; generally flaccid   Neurological: She is unresponsive. GCS eye subscore is 1. GCS verbal subscore is 1. GCS motor subscore is 1.   Non purposeful left eye " movements  Absent Corneal, gag, and cough reflex.  Left pupil 3mm fixed.  (AMANDA right pupil s/t eye irregularity scarring and cloudiness);   No corneal, gag or cough reflex;   no response to noxious stimuli.    Skin: Skin is warm and dry. Capillary refill takes less than 2 seconds.   Healed scar over left knee  Skin breakdown on left calf  +1 generalized edema   Vitals reviewed.    Vents:  Vent Mode: AC   Set Rate: 22 bmp   Vt Set: 350 mL   Pressure Support: 0 cm H20  PEEP/CPAP: 5 cm H20  Oxygen Concentration (%): 30   Peak Airway Pressure: 29 cmH2O   Plateau Pressure: 25 cmH20   Total Ve: 9.24 mL   F/VT Ratio<105 (RSBI): (!) 100     Lines/Drains/Airways     Drain                 Hemodialysis AV Fistula Right upper arm -- days         NG/OG Tube 03/05/18 1737 16 Fr. Right mouth less than 1 day         Rectal Tube 03/05/18 2200 rectal tube w/ balloon (indicate number of mLs) less than 1 day         Urethral Catheter 03/05/18 2030 Temperature probe       Right Chest Wall Port-a-cath less than 1 day          Airway                 Airway - Non-Surgical 03/05/18 1713 Endotracheal Tube less than 1 day          Peripheral Intravenous Line                 Peripheral IV - Single Lumen 03/05/18 1713 Left Forearm less than 1 day         Peripheral IV - Single Lumen 03/05/18 1750 Left Forearm less than 1 day         Peripheral IV - Single Lumen 03/05/18 1810 Left Hand less than 1 day                Significant Labs:    CBC/Anemia Profile:    Recent Labs  Lab 03/05/18  1714 03/05/18  1716 03/06/18  0709   WBC 17.93*  --  11.05   HGB 9.2*  --  8.8*   HCT 29.7* 33* 27.3*     --  227   MCV 92  --  88   RDW 19.4*  --  18.7*        Chemistries:    Recent Labs  Lab 03/05/18  1714 03/05/18  2239 03/06/18  0244 03/06/18  0709   * 144 145 144   K 5.2* 4.7 5.2* 5.3*    103 105 105   CO2 17* 23 24 22*   BUN 58* 65* 70* 69*   CREATININE 9.8* 9.8* 9.8* 10.0*   CALCIUM 10.3 9.7 9.6 9.5   ALBUMIN 3.5  --   --   --    PROT  8.3  --   --   --    BILITOT 0.5  --   --   --    ALKPHOS 140*  --   --   --    *  --   --   --    *  --   --   --    MG 2.6 2.4 2.6 2.9*   PHOS  --  7.0*  --  7.2*     Hemoglobin A1C   Date Value Ref Range Status   03/06/2018 6.1 (H) 4.0 - 5.6 % Final             12/20/2016 7.0 (H) 4.5 - 6.2 % Final     ABGs:   Recent Labs  Lab 03/06/18  0101   PH 7.497*   PCO2 37.3   HCO3 28.9*   POCSATURATED 100   BE 6     Blood Culture:   Recent Labs  Lab 03/05/18  1750 03/05/18  1810   LABBLOO No Growth to date No Growth to date     Lactic Acid:   Recent Labs  Lab 03/05/18  1743 03/06/18  1308   LACTATE 7.5* 2.4*     Troponin:   Recent Labs  Lab 03/05/18  1714 03/05/18  2239 03/06/18  1308   TROPONINI 0.058* 0.376* 0.344*     Urine Culture: In process    Urine Studies:   Recent Labs  Lab 03/05/18  1815 03/05/18  2221   COLORU Yellow Yellow   APPEARANCEUA Hazy* Clear   PHUR 7.0 8.0   SPECGRAV 1.015 1.015   PROTEINUA 3+* 3+*   GLUCUA 1+* 2+*   KETONESU Negative Negative   BILIRUBINUA Negative Negative   OCCULTUA 3+* 3+*   NITRITE Negative Negative   UROBILINOGEN Negative Negative   LEUKOCYTESUR Trace* Negative   RBCUA 20* 30*   WBCUA 10* 2   BACTERIA Few* Occasional   SQUAMEPITHEL 6  --    HYALINECASTS 0 1       Significant Imaging:   CXR (03/05/2018)  I have reviewed all pertinent results/findings within the past 24 hours and my personal findings are:   -ETT above babak, port-a-cath to right chest, NGT in gastric lumen  -Evidence of upper lobe consolidation and atelectasis, mediastinal shift and traction elevation of right hemidiaphragm  -Right upper and middle lung consolidation concerning for aspiration or pneumonia     2D ECHO (3/06/2018)   -Moderate left atrial enlargement  -Low to mildly depressed LV systolic function (EF 50-55%)  -impaired LV relaxation (grade 2 diastolic dysfunction)  -Pulmonary HTN (PA Systolic 46 mmHg)  -Mild Mitral regurgitation  -Increased CVP     CT HEAD without contrast  (03/06/2018)  -No evidence of acute intracranial process.     EKG(03/05/18)  -NSR, prolonged QTc  Assessment/Plan:   Osiel Moeller is a 44 y/o female who is s/p cardiac arrest.. She is sedated on the vent, TTM in progress.     Current Diagnosis  -S/p Cardiac Arrest  -Acute Respiratory Failure with Hypoxia and Hypercapnia  -ESRD on Dialysis  -Systolic Heart Failure  -Uncontrolled HTN  -Diabetes Type 2    Current Problems  -Poor Neurologic status  -Right upper lung consolidation  -Uncontrolled HTN  -Hypergrlycemia  -Hyperphosphatemia  -Hyperkalemia    NEURO  Poor neurologic status  -Concerned for anoxic brain injury in the setting of cardiac arrest; only reassuring sign of neurologic function is that patient is breathing over the vent.   -propofol started overnight s/t myoclonic jerks and hyperventilation  -CT head unremarkable for acute intracranial process.   -Currently on TTM for goal temp 34 deg: had discussion with family about prognostication being possible after 72 hours post TTM.     CARDIAC  S/p Cardiac Arrest:    -Etiology of arrest unknown; no evidence suggesting ACS or systolic dysfunction as trigger;   -Would start heparin gtts for suspicion of PE as possible etiology;   -recommend urine toxicology screen to further assess etiology of arrest.  -Elevated Troponin on admit but trending down, likely s/t cardiac contusion from code.  -Continue TTM for goal temp of 34 degrees; will begin re-warming after 24 hours.     Hypertension   -Hypertensive in the 200s overnight, likely s/t underlying chronic renal etiology; currently uncontrolled.  -Would continue PRN Labetalol for BG goal SBP <180; may need to consider cardene gtts.   -Plan for HD today in the setting of electrolyte abnormality and fluid overload.    HFpEF  -EF 50-55% today, pulmonary HTN, and left atrial enlargement.  - on admit likely reflective of left atrial enlargement and + fluid volume status s/t missed HD.   -Plan for HD today.      RESPIRATORY  Acute Respiratory Failure with Hypoxia and Hypercarbia  -Evidence of upper lobe consolidation and atelectasis with mediastinal shift and traction elevation of right hemidiaphragm on chest x-ray concerning for aspiration or pneumonia.   -Most likely aspiration related in the setting of cardiac arrest; RN suctioned piece of chicken out of ETT.   -Neurologic status not compatible with extubation at this time.  -Oxygenating well on AC 22//P5/FIO2 30%  -Continue aggressive pulmonary toileting; azithromycin/zosyn for CAP coverage    RENAL  ESRD  -Known dialysis patient (MWF); right upper arm fistula present and patent: per family patient missed dialysis 03/05, last dialysis date presumably 03/02.   -Baseline creatinine 8-10, likely reflective of being chronically underdialyzed.    -Needs dialysis s/t abnormal electrolytes and volume overload in the setting of uncontrolled HTN.   -Nephrology following; would not recommend CRRT if able to perform IHD given need to place another line for CRRT.     GI  Diarrhea  -Currently with diarrhea, likely s/t chronic gastroparesis superimposed with kayexelate admin for hyperkalemia  -Would recommend sending stool sample to check for c-diff as well.   -Fecal management system in use to prevent skin breakdown.    ENDOCRINE  Diabetes Mellitus   - HgbA1c 6.1 with estimated avg glucose of 128 today.  -Hyperglycemia 290s-300s likely s/t acute process superimposed with chronic process  -would start on insulin drip for tighter BG control (goal 140-180); Q1 hr accuchecks.    HEME/IMMUNE  - Leukocytosis and lactic acidosis likely s/t acute event: will continue to trend.   -Continue broad spectrum CAP coverage with azithromycin/zosyn; aggressive pulmonary toileting.   -Chronic anemia likely s/t CKD, will monitor for now.     PROPHYLAXIS  PUD- Pepcid  DVT- Heparin gtts    Frida House  [facility]-ACNP Student, Pulmonology  [facility]

## 2018-03-06 NOTE — PLAN OF CARE
Progress notes reviewed. continued ICU care. Critically ill. Poor prognosis  Dialysis patient, missed Monday's HD due to family .    Information attained via medical record. [place] to follow up. Contact info provided to family.   Michael Yates  [phone]     18 1634   Discharge Assessment   Assessment Type Discharge Planning Assessment   Confirmed/corrected address and phone number on facesheet? Yes   Assessment information obtained from? Caregiver;Medical Record   Communicated expected length of stay with patient/caregiver yes   Prior to hospitilization cognitive status: Alert/Oriented   Prior to hospitalization functional status: Independent   Current cognitive status: Coma/Sedated/Intubated;Unable to Assess   Current Functional Status: Completely Dependent   Lives With other relative(s)   Able to Return to Prior Arrangements unable to determine at this time (comments)   Is patient able to care for self after discharge? Unable to determine at this time (comments)   Who are your caregiver(s) and their phone number(s)? Janet   Patient's perception of discharge disposition other (comments)   Readmission Within The Last 30 Days no previous admission in last 30 days   Patient currently being followed by outpatient case management? No   Patient currently receives any other outside agency services? No   Equipment Currently Used at Home none   Do you have any problems affording any of your prescribed medications? No   Is the patient taking medications as prescribed? yes   Does the patient have transportation home? Yes   Transportation Available family or friend will provide   Dialysis Name and Scheduled days Dialysis MWF   Does the patient receive services at the [facility]madin Clinic? No   Discharge Plan A Other   Discharge Plan B Home with family;Other   Patient/Family In Agreement With Plan yes

## 2018-03-06 NOTE — EICU
eICU Note :    Called by the [facility] eRN:    Problem: 1.Pt s/p Cardiac arrest on TTC with Arctic Sun , Pt temp is 32.5 and Arctic Sun set at 36  2. OK to use Propofol for sedation?    Pertinent History and labs reviewed :43-year-old female with history of  HTN, DM,CHF ,ESRD on HD ,intubated in the field , suspected to have aspirated       Treatment /Intervention given: Would lower Target temp to 34 as pt is already hypothermic ,OK to use propofol with min dose required for pt's myoclonic jerks and hyperventilation .        Stefany Brady M.D  eICU Physician

## 2018-03-06 NOTE — PLAN OF CARE
Problem: Patient Care Overview  Goal: Plan of Care Review  Outcome: Ongoing (interventions implemented as appropriate)  Hypothermia maintained with Arctic Sun. Pt turned q 2 hours to maintain skin integrity. Heparin gtt and insulin gtt started today. Pt with + cough reflex but -gag or corneal reflex. No withdrawal to pain. Begin warming at 1900 tonight. Plan of care reviewed with family.

## 2018-03-06 NOTE — PLAN OF CARE
Dr. Matias with eICU at bedside via camera. Notified that patient would have to be warmed to targeted temp of 36C, if that is the temp she would like the goal to be and that the Arctic Sun was set to 33C previously. Patient's current temp is 32.5C. MD states to set goal to 34C for now and let patient come up to targeted temp of 36C gradually. COTAVIO Dumont at bedside as well. Will initiate these orders.  Pallavi Tong RN

## 2018-03-06 NOTE — CONSULTS
[facility] Pulmonology Consult Note - Resident LYNN    Reason for Consult     Critical Care/ TTC and vent    History of Present Illness:     Osiel Moeller is a 43 y.o. female with HFpEF; CAD; DM2; HTN; CKD5 on HD (M/W/F) who presented to [facility] on 3/5/2018 with a primary complaint of Cardiac Arrest    History obtained per chart review and primary team, due to patient condition.  The patient was in their usual state of health (ambulatory, independent) until day of presentation when she was found down.  Per family, she missed her HD session that day to attend a , but had no active complaints.  She was found later that same day unresponsive in her bed at home, with vomit at her mouth, and no pulse.  Bystander CPR initiated by family members until EMS arrival.  Found to be in PEA and given Epi x4, bicarb, calcium, glucose --> VTach --> shock x4 --> ROSC.  She was intubated in the field.  Upon arrival to ED (~5pm), was found to be hypothermic to 32.4, with dilated pupils and occasional myoclonic jerks.  Started on propofol for sedation and targeted temperature control for goal 34 degrees.    Past Medical History:  As above    Past Surgical History:  Past Surgical History:   Procedure Laterality Date     SECTION      x3    CHOLECYSTECTOMY  2009    DIALYSIS FISTULA CREATION Right     upper arm    RETINAL DETACHMENT SURGERY      TUBAL LIGATION      VASCULAR SURGERY Bilateral     vein surgery     Allergies:  Review of patient's allergies indicates:   Allergen Reactions    Tramadol Nausea And Vomiting     Home Medications:  Prior to Admission medications    Medication Sig Start Date End Date Taking? Authorizing Provider   diphenoxylate-atropine 2.5-0.025 mg (LOMOTIL) 2.5-0.025 mg per tablet Take 1 tablet by mouth 4 (four) times daily as needed for Diarrhea.    Historical Provider, MD   gabapentin (NEURONTIN) 600 MG tablet Take 600 mg by mouth 3 (three) times daily.     "Historical Provider, MD   hydrOXYzine HCl (ATARAX) 25 MG tablet Take 1 tablet (25 mg total) by mouth every 6 (six) hours. 18   Edmar Jesus MD   NIFEdipine (PROCARDIA-XL) 60 MG (OSM) 24 hr tablet Take 60 mg by mouth once daily. Take on non-dialysis days only 10/10/17   Historical Provider, MD   ondansetron (ZOFRAN-ODT) 4 MG TbDL Take 2 tablets (8 mg total) by mouth every 6 (six) hours as needed. 17   Marcelina House MD   promethazine (PHENERGAN) 25 MG tablet Take 1 tablet (25 mg total) by mouth every 6 (six) hours as needed for Nausea. 18   Edmar Jesus MD   sertraline (ZOLOFT) 50 MG tablet Take 1 tablet (50 mg total) by mouth once daily. 17   Marcelina House MD   sevelamer carbonate (RENVELA) 800 mg Tab Take 3,200 mg by mouth 3 (three) times daily with meals.    Historical Provider, MD   ULTICARE 1/2 mL 30 gauge x 1/2" Syrg  10/11/16   Historical Provider, MD     Family History:  Family History   Problem Relation Age of Onset    Heart disease Mother     Hyperlipidemia Mother     Hypertension Mother     Heart disease Father     Hypertension Father    Reports mother with history of cardiac arrest    Social History:  Social History   Substance Use Topics    Smoking status: Never Smoker    Smokeless tobacco: Not on file    Alcohol use No     Review of Systems:  Review of systems not obtained due to patient factors - sedated, unresponsive.     Objective:   Last 24 Hour Vital Signs:  BP  Min: 151/79  Max: 207/95  Temp  Av.8 °F (34.3 °C)  Min: 89.4 °F (31.9 °C)  Max: 96.5 °F (35.8 °C)  Pulse  Av.9  Min: 69  Max: 102  Resp  Av  Min: 11  Max: 21  SpO2  Av %  Min: 99 %  Max: 100 %  Height  Av' 7" (170.2 cm)  Min: 5' 7" (170.2 cm)  Max: 5' 7" (170.2 cm)  Weight  Av.1 kg (225 lb 1.4 oz)  Min: 102.1 kg (225 lb 1.4 oz)  Max: 102.1 kg (225 lb 1.4 oz)    Intake/Output Summary (Last 24 hours) at 18 0713  Last data filed at 18 0600   " Gross per 24 hour   Intake           467.08 ml   Output              265 ml   Net           202.08 ml     Physical Examination:  General: intubated, sedated, supine in bed, obese  HEENT: R eye cloudy and scarred, L pupil nonreactive, ETT in place, MMM  Neck: supple, trachea midline, JVP unable to assess 2/2 body habitus  Chest: R chest wall port without fluctuance or overlying erythema  CV: RRR, no murmur appreciated  Resp: mechanical breath sounds bilaterally, on /20/40%/5  Abd: soft, NT, ND, normoactive BS  Extrem: symmetric, no c/c/e  : Marie in place with minimal clear yellow urine  Skin: WWP, healed surgical scar over L knee, mild skin breakdown over L calf  Neuro: intubated, sedated, nonpurposeful eyelid movement, pupils nonreactive, face symmetric, does not move extremities    Laboratory:  Laboratory Data:     Recent Labs  Lab 03/06/18  0101   PH 7.497*   PCO2 37.3   PO2 164*   HCO3 28.9*   POCSATURATED 100   BE 6       Recent Labs  Lab 03/05/18  1714 03/05/18  1716   WBC 17.93*  --    RBC 3.23*  --    HGB 9.2*  --    HCT 29.7* 33*     --    MCV 92  --    MCH 28.5  --    MCHC 31.0*  --        Recent Labs  Lab 03/06/18  0244      K 5.2*      CO2 24   BUN 70*   CREATININE 9.8*   MG 2.6     Microbiology Data:  BCx (3/5) pending  UCx (3/5) pending    Other Results:  Radiology Data:   CXR (3/5) - R chest port, ETT above babak, RUL consolidation with mediastinal shift    2D Echo:   11/2016 - EF 55%, no WMA, dilated LA, grade 2 diastolic dysfx, PAP 32mmHg    Current Medications:     Infusions:   propofol 20 mcg/kg/min (03/06/18 0611)        Scheduled:   acetaminophen  650 mg Per NG tube Q6H    piperacillin-tazobactam (ZOSYN) IVPB  4.5 g Intravenous Q12H        PRN:  dextrose 50%, dextrose 50%, glucagon (human recombinant), glucose, glucose, insulin aspart U-100, sodium chloride 0.9%    Antibiotics and Day Number of Therapy:  Vancomycin, piperacillin-tazobactam (Day 2)    Lines and Day  Number of Therapy:  PIV L  Rectal tube  OGT  Marie  ETT  R chest port    Assessment/Plan:     Osiel Moeller is a 43 y.o.female with    Acute Hypoxic and Hypercapnic Respiratory Failure   - continue ventilation per ARDSnet protocol  - agree with propofol for sedation    S/p Cardiac Arrest  - continue targeted temperature management to 34 degrees with rewarming to begin after 24 hours  - elevated troponin likely 2/2 post-code, but would continue to trend  - recommend CT head and UTox to assess for etiology of code  can consider CTA chest, EEG when more stable    RUL Aspiration and Atelectasis  - CXR with mediastinal shift, likely atelectasis - may be some airway obstruction  - continue broad spectrum abx with vanc/zosyn    Essential Hypertension  - continue labetalol PRN if responsive  obtain CT head prior  - otherwise, may need to consider cardene gtt to maintain SBP<180    ESRD on HD  - continue dialysis per Nephrology  - would not recommend CRRT at present, if able to perform IHD, as this would require additional line placement    DM2 with Hyperglycemia  - recommend insulin gtt for goal -180 with frequent accuchecks    Viviane Justin  [facility] [id number]  [facility] Pulmonology Service

## 2018-03-06 NOTE — H&P
[facility] Internal Medicine History and Physical - Resident Note    Admitting Team: Medicine Team B  Attending Physician: Nathalie  Resident: Mohinder  Interns: Siva    Date of Admit: 3/5/2018    Chief Complaint     Cardiac Arrest   for 1 day    Subjective:      History of Present Illness:  Osiel Moeller is a 43 y.o. female who  has a past medical history of Blind; CHF (congestive heart failure); Coronary artery disease; Depression; Diabetes mellitus; Heart murmur; Hypertension; Positive D dimer; and Renal failure, chronic.. The patient presented to [facility] on 3/5/2018 with a primary complaint of Cardiac Arrest    Osiel Moeller is a 43 year old woman with pertinent medical history of HFpEF, CKDV on HD, HTN, and DM type II who was in her usual state of health (HD Mon, Wed, Fri; independent with ADLs) until day of admission, when she was noted to attend a  of a  relative. Patient is unable to provide history due to presentation. She was scheduled for routine dialysis session on Monday, however was unable to obtain transportation due to family attending the . Per patient's sister Janet, patient was doing well day of admission, despite missing dialysis session, and was noted to be drinking alcoholic beverages at the family gathering. Patient was last seen walking to her room at the back of the house. Per Janet, patient was found across her bed with vomit in her mouth, nonresponsive and without pulse. Family members enacted CPR at the scene and EMS arrived within 20 minutes of being called. Per report, patient was in PEA, and received 4x rounds of epinephrine, bicarb, calcium, glucose. Patient then was in V-tac and cardioversion achieved after 4th synchronized shock. Patient was intubated in field for airway protection, notable for gross vomit obtained via intubation. No other complaints on presentation. .        Past Medical History:  Past Medical History:  "  Diagnosis Date    Blind     CHF (congestive heart failure)     Coronary artery disease     Depression     Diabetes mellitus     Heart murmur     Hypertension     Positive D dimer     Renal failure, chronic        Past Surgical History:  Past Surgical History:   Procedure Laterality Date     SECTION      x3    CHOLECYSTECTOMY  2009    DIALYSIS FISTULA CREATION Right     upper arm    RETINAL DETACHMENT SURGERY      TUBAL LIGATION      VASCULAR SURGERY Bilateral     vein surgery       Allergies:  Review of patient's allergies indicates:   Allergen Reactions    Tramadol Nausea And Vomiting       Home Medications:  Prior to Admission medications    Medication Sig Start Date End Date Taking? Authorizing Provider   diphenoxylate-atropine 2.5-0.025 mg (LOMOTIL) 2.5-0.025 mg per tablet Take 1 tablet by mouth 4 (four) times daily as needed for Diarrhea.    Historical Provider, MD   gabapentin (NEURONTIN) 600 MG tablet Take 600 mg by mouth 3 (three) times daily.    Historical Provider, MD   hydrOXYzine HCl (ATARAX) 25 MG tablet Take 1 tablet (25 mg total) by mouth every 6 (six) hours. 18   Edmar Jesus MD   NIFEdipine (PROCARDIA-XL) 60 MG (OSM) 24 hr tablet Take 60 mg by mouth once daily. Take on non-dialysis days only 10/10/17   Historical Provider, MD   ondansetron (ZOFRAN-ODT) 4 MG TbDL Take 2 tablets (8 mg total) by mouth every 6 (six) hours as needed. 17   Marcelina House MD   promethazine (PHENERGAN) 25 MG tablet Take 1 tablet (25 mg total) by mouth every 6 (six) hours as needed for Nausea. 18   Edmar Jesus MD   sertraline (ZOLOFT) 50 MG tablet Take 1 tablet (50 mg total) by mouth once daily. 17   Marcelina House MD   sevelamer carbonate (RENVELA) 800 mg Tab Take 3,200 mg by mouth 3 (three) times daily with meals.    Historical Provider, MD   ULTICARE 1/2 mL 30 gauge x 1/2" Syrg  10/11/16   Historical Provider, MD       Family History:  Family " "History   Problem Relation Age of Onset    Heart disease Mother     Hyperlipidemia Mother     Hypertension Mother     Heart disease Father     Hypertension Father        Social History:  Social History   Substance Use Topics    Smoking status: Never Smoker    Smokeless tobacco: Not on file    Alcohol use No       Review of Systems:  Pertinent positives and negatives are listed in HPI.  All other systems are reviewed and are negative.    Health Maintaince :   Primary Care Physician: [facility]     Objective:   Last 24 Hour Vital Signs:  BP  Min: 151/79  Max: 202/93  Temp  Av.9 °F (33.8 °C)  Min: 89.4 °F (31.9 °C)  Max: 96.5 °F (35.8 °C)  Pulse  Av.9  Min: 71  Max: 102  Resp  Av.4  Min: 11  Max: 21  SpO2  Av %  Min: 99 %  Max: 100 %  Height  Av' 7" (170.2 cm)  Min: 5' 7" (170.2 cm)  Max: 5' 7" (170.2 cm)  Weight  Av.1 kg (225 lb 1.4 oz)  Min: 102.1 kg (225 lb 1.4 oz)  Max: 102.1 kg (225 lb 1.4 oz)  Body mass index is 35.25 kg/m².  No intake/output data recorded.    Physical Examination:  General: s/p intubation, obese BMI 35  Head: normocephalic, atraumatic   Eyes: left pupil 3mm, non reactive to light. Right eye with pterygoid process. +Dolls eye sign  Ears, Nose, Throat:  without rhinorrhea, OP obscured by ETT placement  Neck: supple, without thyromegaly  Cardiovascular: NRRR, without murmurs or rubs, without extra heart sounds, right UE fistula with good thrill  Pulmonary: CTABL, normal work of breathing without accessory muscle use,   Abdomen: sot, nontender, nondistended   MSKL: without gross deformities; passive ROM achieved  Lymphatic: no appreciated cervical or axillary LAD  Skin: without cyanosis, clubbing, or edema   Neurologic: intubated. Absent corneal reflex, absent light reflex, does not withdraw to pain.       Laboratory:  Most Recent Data:  CBC: Lab Results   Component Value Date    WBC 17.93 (H) 2018    HGB 9.2 (L) 2018    HCT 33 (L) 2018 "     03/05/2018    MCV 92 03/05/2018    RDW 19.4 (H) 03/05/2018     BMP: Lab Results   Component Value Date     03/05/2018    K 4.7 03/05/2018     03/05/2018    CO2 23 03/05/2018    BUN 65 (H) 03/05/2018    CREATININE 9.8 (H) 03/05/2018     (H) 03/05/2018     (H) 03/05/2018    CALCIUM 9.7 03/05/2018    MG 2.4 03/05/2018    PHOS 7.0 (H) 03/05/2018     LFTs: Lab Results   Component Value Date    PROT 8.3 03/05/2018    ALBUMIN 3.5 03/05/2018    BILITOT 0.5 03/05/2018     (H) 03/05/2018    ALKPHOS 140 (H) 03/05/2018     (H) 03/05/2018     Coags:   Lab Results   Component Value Date    INR 0.9 10/19/2016     Urinalysis: Lab Results   Component Value Date    COLORU Yellow 03/05/2018    SPECGRAV 1.015 03/05/2018    NITRITE Negative 03/05/2018    KETONESU Negative 03/05/2018    UROBILINOGEN Negative 03/05/2018    WBCUA 2 03/05/2018       Trended Lab Data:    Recent Labs  Lab 03/05/18 1714 03/05/18 1716 03/05/18 2239   WBC 17.93*  --   --    HGB 9.2*  --   --    HCT 29.7* 33*  --      --   --    MCV 92  --   --    RDW 19.4*  --   --    *  --  144   K 5.2*  --  4.7     --  103   CO2 17*  --  23   BUN 58*  --  65*   CREATININE 9.8*  --  9.8*   *  --  298*  298*   PROT 8.3  --   --    ALBUMIN 3.5  --   --    BILITOT 0.5  --   --    *  --   --    ALKPHOS 140*  --   --    *  --   --        Trended Cardiac Data:    Recent Labs  Lab 03/05/18 1714 03/05/18  2239   TROPONINI 0.058* 0.376*   *  --        Microbiology Data:  Blood cultures x2 pending    Other Laboratory Data:  Lactate 7.5    Other Results:  EKG (my interpretation):   NSR, prolonged QTc    Radiology:  Imaging Results          X-Ray Chest AP Portable (No Result on File)                X-Ray Chest 1 View (Final result)  Result time 03/05/18 17:45:18    Final result by Tarun Albert MD (03/05/18 17:45:18)                 Impression:     Interval development of consolidation  in the right upper lung zone that could relate to aspiration, pneumonia, or atelectasis. Correlation and further evaluation advised.      Electronically signed by: HUNTER PETERSON MD  Date:     03/05/18  Time:    17:45              Narrative:    Cardiac arrest.    Comparison: 12/21/16.    Single frontal view the chest was obtained.    There is a right chest single lumen Port-A-Cath the tip of which appears projected over the right chest. The patient is intubated. The endotracheal tube is above the level of the babak. The trachea is patent. The cardiac silhouette appears normal in size. There has been interval development of consolidation involving the entire right upper lung zone with ipsilateral mediastinal shift. Left lung is well-expanded and clear. There is no effusion or pneumothorax. No free air below the diaphragm. NG tube is seen with tip in the upper abdomen region. There is a stent in the right subclavian region. No acute osseous abnormality. TIPS is in the right upper quadrant.                                 Assessment:     Osiel Moeller is a 43 y.o. female with:     Plan:     #) Acute hypoxic and hypercapnic respiratory failure s/p cardiac arrest  - patient currently intubated; pulm following for vent management; trend ABGs for titration; on propofol for sedation  - possible aspiration pneumonia based on CXR and HPI    #) severe sepsis likely due to aspiration pneumonia (RUL) with acute encephalopathy  - as above; trending lactates, continuing IV abx with vancomycin and zosyn.  - CT head negative for acute process    #) post cardiac arrest  - on cooling protocol per pulm/crit and cardiology  - echo pending for AM, trending troponin and EKG.  - poor prognosis; ongoing family discussion    #) troponinemia  - frederick from 0.058 to 0.376; cardiology following, likely secondary to arrest  - EKG without ST changes    #) CKDV on HD  - MWF dialysis, nephrology consulted for HD tomorrow, graft with  good thrill on exam    #) Heart failure with preserved ejection fraciton  -  on admission, euvolemic on presentation  - will monitor; repeat echocardiogram as above.    #) DM type II  - covering with sliding scale for now    #) HTN  - holding home anti-htn in setting of critical illness  - restarted labetalol PRN for SBP>180.    #) elevated transaminates  - likely elevated s/p cardiac arrest.     #) normocytic anemia  - h/h 9.2/29.7 on admission; anemia panel pending    Diet: npo  Code: full - ongoing family discussion with SonChip in terms of code status.   DVTppx: none- high bleed risk    Dispo: continued ICU care. Critically ill. Poor prognosis      Code Status:     full    Armani Vines  [facility] Internal Medicine HO-II  [facility] Medicine Service    [facility] Medicine Hospitalist Pager numbers:   [facility] Hospitalist Medicine Team A (Sarah/Venkat): [phone]  [facility] Hospitalist Medicine Team B (Genesis/Nathalie):  [phone]

## 2018-03-06 NOTE — CONSULTS
[facility]  Cardiology  Consult Note    Patient Name: Osiel Moeller  MRN: [id number]  Admission Date: 3/5/2018  Hospital Length of Stay: 1 days  Code Status: Full Code   Attending Provider: Eddie Zelaya MD   Consulting Provider: Nimesh Alonzo MD  Primary Care Physician: Moises Long MD  Principal Problem:<principal problem not specified>    Patient information was obtained from relative(s), past medical records and ER records.     Consults  Subjective:     Chief Complaint:  Out of hospital cardiac arrest     HPI: This is a 43 Y O F patient with past medical history of ESRD on HD (MWF) last HD 3/2/2018, hypertension, diabetes mellitus, blindness, diastolic dysfunction brought to the hospital when she was found unresponsive in the bed. Patient's cousin was with the patient, who left the patient for 1 minute and when she came back found the patient lying in the bed, not responsive, covered in vomitus. Patient did not have a pulse, CPR was initiated by family member, when EMS arrived found to be in PEA, 4 rounds of Epi, followed by ventricular tachycardia, defibrillation x4 with ROSC. Patient is currently undergoing Targeted temperature protocol. Family members (sister and cousin) at bedside provided most of the history. Prior to the event yesterday patient did not complain of anything. She missed her HD Monday, last HD was on 3/2/2018.    Past Medical History:   Diagnosis Date    Blind     CHF (congestive heart failure)     Coronary artery disease     Depression     Diabetes mellitus     Heart murmur     Hypertension     Positive D dimer     Renal failure, chronic        Past Surgical History:   Procedure Laterality Date     SECTION      x3    CHOLECYSTECTOMY  2009    DIALYSIS FISTULA CREATION Right     upper arm    RETINAL DETACHMENT SURGERY      TUBAL LIGATION      VASCULAR SURGERY Bilateral     vein surgery       Review of patient's allergies  "indicates:   Allergen Reactions    Tramadol Nausea And Vomiting       No current facility-administered medications on file prior to encounter.      Current Outpatient Prescriptions on File Prior to Encounter   Medication Sig    diphenoxylate-atropine 2.5-0.025 mg (LOMOTIL) 2.5-0.025 mg per tablet Take 1 tablet by mouth 4 (four) times daily as needed for Diarrhea.    gabapentin (NEURONTIN) 600 MG tablet Take 600 mg by mouth 3 (three) times daily.    hydrOXYzine HCl (ATARAX) 25 MG tablet Take 1 tablet (25 mg total) by mouth every 6 (six) hours.    NIFEdipine (PROCARDIA-XL) 60 MG (OSM) 24 hr tablet Take 60 mg by mouth once daily. Take on non-dialysis days only    ondansetron (ZOFRAN-ODT) 4 MG TbDL Take 2 tablets (8 mg total) by mouth every 6 (six) hours as needed.    promethazine (PHENERGAN) 25 MG tablet Take 1 tablet (25 mg total) by mouth every 6 (six) hours as needed for Nausea.    sertraline (ZOLOFT) 50 MG tablet Take 1 tablet (50 mg total) by mouth once daily.    sevelamer carbonate (RENVELA) 800 mg Tab Take 3,200 mg by mouth 3 (three) times daily with meals.    ULTICARE 1/2 mL 30 gauge x 1/2" Syrg      Family History     Problem Relation (Age of Onset)    Heart disease Mother, Father    Hyperlipidemia Mother    Hypertension Mother, Father        Social History Main Topics    Smoking status: Never Smoker    Smokeless tobacco: Not on file    Alcohol use No    Drug use: Unknown    Sexual activity: No     Review of Systems   Unable to perform ROS: mental status change     Objective:     Vital Signs (Most Recent):  Temp: 96.6 °F (35.9 °C) (03/06/18 1000)  Pulse: 78 (03/06/18 1102)  Resp: (!) 28 (03/06/18 1102)  BP: (!) 195/96 (03/06/18 1100)  SpO2: 100 % (03/06/18 1102) Vital Signs (24h Range):  Temp:  [89.4 °F (31.9 °C)-96.6 °F (35.9 °C)] 96.6 °F (35.9 °C)  Pulse:  [] 78  Resp:  [11-28] 28  SpO2:  [99 %-100 %] 100 %  BP: (151-207)/() 195/96     Weight: 102.1 kg (225 lb 1.4 oz)  Body mass " index is 35.25 kg/m².    SpO2: 100 %  O2 Device (Oxygen Therapy): ventilator      Intake/Output Summary (Last 24 hours) at 03/06/18 1213  Last data filed at 03/06/18 1100   Gross per 24 hour   Intake           528.58 ml   Output              292 ml   Net           236.58 ml       Lines/Drains/Airways     Drain                 Hemodialysis AV Fistula Right upper arm -- days         NG/OG Tube 03/05/18 1737 16 Fr. Right mouth less than 1 day         Rectal Tube 03/05/18 2200 rectal tube w/ balloon (indicate number of mLs) less than 1 day         Urethral Catheter 03/05/18 2030 Temperature probe less than 1 day          Airway                 Airway - Non-Surgical 03/05/18 1713 Endotracheal Tube less than 1 day          Peripheral Intravenous Line                 Peripheral IV - Single Lumen 03/05/18 1713 Left Forearm less than 1 day         Peripheral IV - Single Lumen 03/05/18 1750 Left Forearm less than 1 day         Peripheral IV - Single Lumen 03/05/18 1810 Left Hand less than 1 day                Physical Exam   Constitutional:   Intubated and mechanically ventilated. Does not respond to tactile stimlus   HENT:   Head: Normocephalic and atraumatic.   Eyes:   Pupils are mid dilated and fixed bilaterally.   Neck: No JVD present.   Cardiovascular:   S1 S2 heard, RRR, no R/G/M   Pulmonary/Chest:   Bilateral air entry positive, coarse breath sounds. No crackles or wheeze.    Abdominal: Soft.   Musculoskeletal:   Trace pedal edema, 2+ pulses   Neurological:   On propofol drip, does not respond to pain, no Pupillary light reflex or corneal reflex.        Significant Labs:   CMP   Recent Labs  Lab 03/05/18  1714 03/05/18  2239 03/06/18  0244 03/06/18  0709   * 144 145 144   K 5.2* 4.7 5.2* 5.3*    103 105 105   CO2 17* 23 24 22*   * 298*  298* 311*  311* 290*  290*   BUN 58* 65* 70* 69*   CREATININE 9.8* 9.8* 9.8* 10.0*   CALCIUM 10.3 9.7 9.6 9.5   PROT 8.3  --   --   --    ALBUMIN 3.5  --   --   --     BILITOT 0.5  --   --   --    ALKPHOS 140*  --   --   --    *  --   --   --    *  --   --   --    ANIONGAP 25* 18* 16 17*   ESTGFRAFRICA 5* 5* 5* 5*   EGFRNONAA 4* 4* 4* 4*   , CBC   Recent Labs  Lab 03/05/18  1714  03/06/18  0709   WBC 17.93*  --  11.05   HGB 9.2*  --  8.8*   HCT 29.7*  < > 27.3*     --  227   < > = values in this interval not displayed., Troponin   Recent Labs  Lab 03/05/18  1714 03/05/18  2239   TROPONINI 0.058* 0.376*    and All pertinent lab results from the last 24 hours have been reviewed.    Significant Imaging: X-Ray: CXR: X-Ray Chest 1 View (CXR):   Results for orders placed or performed during the hospital encounter of 03/05/18   X-Ray Chest 1 View    Narrative    Cardiac arrest.    Comparison: 12/21/16.    Single frontal view the chest was obtained.    There is a right chest single lumen Port-A-Cath the tip of which appears projected over the right chest. The patient is intubated. The endotracheal tube is above the level of the babak. The trachea is patent. The cardiac silhouette appears normal in size. There has been interval development of consolidation involving the entire right upper lung zone with ipsilateral mediastinal shift. Left lung is well-expanded and clear. There is no effusion or pneumothorax. No free air below the diaphragm. NG tube is seen with tip in the upper abdomen region. There is a stent in the right subclavian region. No acute osseous abnormality. TIPS is in the right upper quadrant.    Impression     Interval development of consolidation in the right upper lung zone that could relate to aspiration, pneumonia, or atelectasis. Correlation and further evaluation advised.      Electronically signed by: HUNTER PETERSON MD  Date:     03/05/18  Time:    17:45      Asessment and Plan:     This is a 43 y O F patient with     1. Out of hospital cardiac arrest, PEA arrest  2. Aspiration pneumonitis  3. Hypertensive emergency  4. ESRD on HD  5.  Diabetes mellitus  6. Diastolic dysfunction    -Patient's EKG on admission and repeat one does not show any ischemic changes.  -Troponin level 0.05 -> 0.378, not consistent with ACS, likely due to demand ischemia from hypertension. Continue to trend troponin levels until they plateau.   -Electrolytes on admission are not grossly abnormal to explain her cardiac arrest  -Likely from respiratory failure 2/2 aspiration, but unknown what led to aspiration.   -Continue with supportive care and targeted temperature protocol  -Needs assessment of her brain function once TTP is over and patient is rewarmed.   -Needs better blood pressure control, will discuss with staff if Cardene gtt is suitable option.    Thank you for your consult. Will discuss with staff.    Nimesh Alonzo MD  Cardiology   [facility]

## 2018-03-06 NOTE — PLAN OF CARE
Problem: Patient Care Overview  Goal: Plan of Care Review  Outcome: Ongoing (interventions implemented as appropriate)  Pt lying in bed resting comfortably, withdraws to pain with upper extremities. Positive for cough but no gag reflex. Respirations even and unlabored. SATS 100% on current vent settings AC 20/450/5/40%. Hypertensive. Tele NSR with HR 70's. Urine output 0-15ml/hr. Flexi--seal with 100ml loose tan BM. Targeted temp continued--pt currently at 35 degrees celsius.

## 2018-03-06 NOTE — CONSULTS
Nephrology Consult  H&P      Consult Requested By: Eddie Zelaya MD  Reason for Consult: ESRD    SUBJECTIVE:     History of Present Illness: obtained from sister  Patient is a 43 y.o. female presents with cardiac arrest  Was at the family  yesterday and start feeling tired, went to sit down asked for water and then 2 min later was found unresponsive, CPR started immediately and EMS arrived within 10-15 min, intubatedm CPR--> ROSC-->hypotermia in ICU    ESRD on HD with Dr. Collado in [place] [facility]  LAst HD friday    Review of Systems   Unable to perform ROS: Critical illness       Past Medical History:   Diagnosis Date    Blind     CHF (congestive heart failure)     Coronary artery disease     Depression     Diabetes mellitus     Heart murmur     Hypertension     Positive D dimer     Renal failure, chronic      Past Surgical History:   Procedure Laterality Date     SECTION      x3    CHOLECYSTECTOMY  2009    DIALYSIS FISTULA CREATION Right     upper arm    RETINAL DETACHMENT SURGERY      TUBAL LIGATION      VASCULAR SURGERY Bilateral     vein surgery     Family History   Problem Relation Age of Onset    Heart disease Mother     Hyperlipidemia Mother     Hypertension Mother     Heart disease Father     Hypertension Father      Social History   Substance Use Topics    Smoking status: Never Smoker    Smokeless tobacco: Not on file    Alcohol use No       Review of patient's allergies indicates:   Allergen Reactions    Tramadol Nausea And Vomiting            OBJECTIVE:     Vital Signs (Most Recent)  Vitals:    18 1100 18 1102 18 1200 18 1231   BP: (!) 195/96   (!) 192/94   Pulse: 77 78  79   Resp: (!) 26 (!) 28  (!) 28   Temp: 97 °F (36.1 °C)  97.2 °F (36.2 °C)    TempSrc: Rectal  Rectal    SpO2: 100% 100%  100%   Weight:       Height:               Date 18 0700 - 18 0659   Shift 8970-4909 9489-7347 8898-5217 24 Hour Total    I  N  T  A  K  E   I.V.  (mL/kg) 61.5  (0.6)   61.5  (0.6)    Shift Total  (mL/kg) 61.5  (0.6)   61.5  (0.6)   O  U  T  P  U  T   Urine  (mL/kg/hr) 27   27    Shift Total  (mL/kg) 27  (0.3)   27  (0.3)   Weight (kg) 102.1 102.1 102.1 102.1           Medications:   acetaminophen  650 mg Per NG tube Q6H    azithromycin  500 mg Intravenous Q24H    famotidine (PF)  20 mg Intravenous Daily    piperacillin-tazobactam (ZOSYN) IVPB  4.5 g Intravenous Q12H    sodium polystyrene  15 g Oral Once           Physical Exam   Constitutional: She is well-developed, well-nourished, and in no distress. No distress.   Morbidly obese   HENT:   Head: Normocephalic and atraumatic.   Mouth/Throat: Oropharynx is clear and moist.   Eyes: EOM are normal. No scleral icterus.   Neck: Neck supple. No JVD present.   Cardiovascular: Normal rate and regular rhythm.  Exam reveals no friction rub.    No murmur heard.  Pulmonary/Chest: Effort normal. No respiratory distress. She has decreased breath sounds. She has no wheezes. She has no rales.   R upper chest SQ port   Abdominal: Soft. Bowel sounds are normal. She exhibits no distension. There is no tenderness.   Musculoskeletal: She exhibits no edema.   FAMILIA AVF, good thrill   Neurological:   Sedated with propofol   Skin: Skin is warm and dry. No rash noted. She is not diaphoretic. No erythema.       Laboratory:    Recent Labs  Lab 03/05/18  1714 03/05/18  1716 03/06/18  0709 03/06/18  1308   WBC 17.93*  --  11.05 13.36*   HGB 9.2*  --  8.8* 9.9*   HCT 29.7* 33* 27.3* 30.8*     --  227 234  234   MONO 3.1*  0.6  --  4.3  0.5 2.7*  0.4       Recent Labs  Lab 03/05/18  2239 03/06/18  0244 03/06/18  0709 03/06/18  1307 03/06/18  1308    145 144 141  --    K 4.7 5.2* 5.3* 5.6*  --     105 105 100  --    CO2 23 24 22* 23  --    BUN 65* 70* 69* 74*  --    CREATININE 9.8* 9.8* 10.0* 10.1*  --    CALCIUM 9.7 9.6 9.5 9.4  --    PHOS 7.0*  --  7.2*  --  7.3*       Diagnostic  Results:  X-Ray: Reviewed  US: Reviewed  Echo: Reviewed  ASSESSMENT/PLAN:     1. ESRD (N18.6 Z99.2) - usual HD on MWF with Dr. Collado in [facility]  LA[facility] HD Friday   Currently on hypothermia protocol  Electrolytes acceptable  Will hold off HD till pt is rewarmed    2. HTN (I10) - labile, monitor for now  3. Anemia of chronic kidney disease treated with CHING (N18.9 D63.1) - No epogen in the settings of ACS    Recent Labs  Lab 03/05/18  1714 03/05/18  1716 03/06/18  0709 03/06/18  1308   HGB 9.2*  --  8.8* 9.9*   HCT 29.7* 33* 27.3* 30.8*     --  227 234  234       Lab Results   Component Value Date    IRON 10 (L) 12/20/2016    TIBC 184 (L) 12/20/2016    FERRITIN 935 (H) 12/20/2016       4. MBD (E88.9 M90.80) - on Sevelamer, resume if//when able to eat    Recent Labs  Lab 03/06/18  1307 03/06/18  1308   CALCIUM 9.4  --    PHOS  --  7.3*       Recent Labs  Lab 03/06/18  0244 03/06/18  0709 03/06/18  1307   MG 2.6 2.9* 2.9*       Lab Results   Component Value Date    CALCIUM 9.4 03/06/2018    PHOS 7.3 (H) 03/06/2018     No results found for: [id number]    Lab Results   Component Value Date    CO2 23 03/06/2018       5. Hemodialysis Access (Z99.2 V45.11)- FAMILIA AVF    ROSC --> continue hypothermia protocol, cards, critical care follows        ADDENDUM 15:00 - repeated labs K and acidosis worsening, core temp 36. Will do 2 hours HD to correct electrolytes. Keep Dialysate temperature 35.5-36 C      Total critical care time 35 minutes: included management of organ failures related to critical illness, titration of continuous infusions, review of pertinent labs and imaging studies, discussion with primary team      Thank you for consult, will follow  With any question please call [phone]  Felisa Puga    [facility]  SHAI Collado MD, FACP,   SHANE Yuen MD,   MD SCHUYLER Yarbrough, NP  [address]  [place], [address]  [phone]

## 2018-03-06 NOTE — LOPA/MORA/SWTA/AOC/AEB
[facility] [facility] ([facility])  On-Site Evaluation  [facility] Contact # [phone]            Thank you for the referral of this patient to determine suitability for organ, tissue, and eye donation.  A chart review has been conducted (date): 3/6 /2018  at (time) 08:30 .  [facility] findings are as follows:    ? Potential candidate for organ donation - [facility] following patient. Any changes in patients condition, discussion of withdrawing the vent or brain death exams, Immediately call [phone].    ?       [facility] Representative: SUNSHINE YanesN, RN CCRN     [facility] Referral Number:  [id number]

## 2018-03-06 NOTE — PROGRESS NOTES
"[facility] Internal Medicine Resident HO-II Progress Note    Subjective:      Per RN ICU, reports of patient's BPs with systolic max 200s overnight. No appreciated spontaneous activity overnight. Continued on cooling protocol. No other complaints.      Objective:   Last 24 Hour Vital Signs:  BP  Min: 151/79  Max: 207/95  Temp  Av.8 °F (34.3 °C)  Min: 89.4 °F (31.9 °C)  Max: 96.5 °F (35.8 °C)  Pulse  Av.9  Min: 69  Max: 102  Resp  Av  Min: 11  Max: 21  SpO2  Av %  Min: 99 %  Max: 100 %  Height  Av' 7" (170.2 cm)  Min: 5' 7" (170.2 cm)  Max: 5' 7" (170.2 cm)  Weight  Av.1 kg (225 lb 1.4 oz)  Min: 102.1 kg (225 lb 1.4 oz)  Max: 102.1 kg (225 lb 1.4 oz)  I/O last 3 completed shifts:  In: 467.1 [I.V.:117.1; NG/GT:150; IV Piggyback:200]  Out: 265 [Urine:65; Drains:100; Stool:100]    Physical Examination:  General: s/p intubation; currently on 40% O2 with PEEP 5. Vt 350. , obese BMI 35, on cooling protocol  Head: normocephalic, atraumatic   Eyes: left pupil 3mm, non reactive to light. Right eye with pterygoid process. +Dolls eye sign  Ears, Nose, Throat:  without rhinorrhea, OP obscured by ETT placement. NG in place.   Neck: supple, without thyromegaly  Cardiovascular: NRRR, without murmurs or rubs, without extra heart sounds, right UE fistula with good thrill  Pulmonary: rhonchi heard over RUL, mechanical breath sounds  Abdomen: soft, nontender, nondistended   MSKL: without gross deformities; passive ROM achieved  Lymphatic: no appreciated cervical or axillary LAD  Skin: without cyanosis, clubbing, or edema   Neurologic: intubated. Absent corneal reflex, absent light reflex, does not withdraw to pain.       Laboratory:  Laboratory Data Reviewed: yes  Pertinent Findings:    Recent Labs  Lab 18  1714 18  1716 18  2239 18  0244 18  0709   WBC 17.93*  --   --   --  11.05   HGB 9.2*  --   --   --  8.8*   HCT 29.7* 33*  --   --  27.3*     --   --   --  227   MCV 92  " --   --   --  88   RDW 19.4*  --   --   --  18.7*   *  --  144 145 144   K 5.2*  --  4.7 5.2* 5.3*     --  103 105 105   CO2 17*  --  23 24 22*   BUN 58*  --  65* 70* 69*   CREATININE 9.8*  --  9.8* 9.8* 10.0*   *  --  298*  298* 311*  311* 290*  290*   PROT 8.3  --   --   --   --    ALBUMIN 3.5  --   --   --   --    BILITOT 0.5  --   --   --   --    *  --   --   --   --    ALKPHOS 140*  --   --   --   --    *  --   --   --   --          Microbiology Data Reviewed: yes  Pertinent Findings:  Blood culture x2; NGTD  Respiratory culture from sputum; not resulted  Urine culture pending      Other Results:  EKG (my interpretation): NSR    Radiology Data Reviewed: yes  Pertinent Findings:       Current Medications:     Infusions:   propofol 20 mcg/kg/min (03/06/18 0611)        Scheduled:   acetaminophen  650 mg Per NG tube Q6H    piperacillin-tazobactam (ZOSYN) IVPB  4.5 g Intravenous Q12H        PRN:  dextrose 50%, dextrose 50%, glucagon (human recombinant), glucose, glucose, insulin aspart U-100, sodium chloride 0.9%    Antibiotics and Day Number of Therapy:  No new    Lines and Day Number of Therapy:  PIVx2.     Assessment:     Osiel Moeller is a 43 y.o.female with  Patient Active Problem List    Diagnosis Date Noted    Acute respiratory failure with hypoxia and hypercapnia 03/05/2018    Pneumonia of both lungs due to infectious organism 12/22/2016    Type 2 diabetes mellitus with complication 12/21/2016    ESRD on dialysis     Volume overload 12/19/2016    Pneumonia 12/19/2016    Vision loss 11/29/2016    Palpitations 11/22/2016    Epigastric pain 11/16/2016    Chronic congestive heart failure 11/08/2016    Dyspnea 10/31/2016    HTN (hypertension) 10/31/2016    Diabetes 1.5, managed as type 1 10/31/2016    ESRD (end stage renal disease) 10/31/2016    Aortic sclerosis 10/31/2016    Mechanical complication of other vascular device, implant, and graft  07/17/2014        Plan:     #) Acute hypoxic and hypercapnic respiratory failure s/p cardiac arrest  - patient currently intubated; pulm following for vent management; trend ABGs for titration; on propofol for sedation, able to wean O2 overnight  - likely aspiration event w/ pneumonia based on CXR and HPI     #) severe sepsis likely due to aspiration pneumonia (RUL) with acute encephalopathy  - as above; trending lactates, continuing IV abx with vancomycin and zosyn.  - CT head negative for acute process  - on propofol for sedation     #) post cardiac arrest  - on cooling protocol per pulm/crit and cardiology  - echo pending for AM, trending troponin and EKG.  - poor prognosis; ongoing family discussion     #) troponinemia  - frederick from 0.058 to 0.376; cardiology following, likely secondary to arrest  - EKG without ST changes     #) CKDV on HD  - MWF dialysis, nephrology consulted for HD today; graft with good thrill on exam     #) Heart failure with preserved ejection fraciton  -  on admission, euvolemic on presentation  - will monitor; repeat echocardiogram as above.     #) DM type II  - covering with sliding scale for now     #) HTN  - holding home anti-htn in setting of critical illness  - restarted labetalol PRN for SBP>180.     #) elevated transaminates  - likely elevated s/p cardiac arrest.      #) normocytic anemia  - h/h 9.2/29.7 on admission; anemia panel pending     Diet: npo  Code: full - ongoing family discussion with SonChip in terms of code status.   DVTppx: none- high bleed risk     Dispo: continued ICU care. Critically ill. Poor prognosis       Armani Vines  [facility] Internal Medicine HO-II  [facility] Internal Medicine Service Team B     [facility] Medicine Hospitalist Pager numbers:   [facility] Hospitalist Medicine Team A (Sarah/Venkat): [phone]  [facility] Hospitalist Medicine Team B (Genesis/Nathalie):  [phone]

## 2018-03-07 LAB
ANION GAP SERPL CALC-SCNC: 14 MMOL/L
ANION GAP SERPL CALC-SCNC: 15 MMOL/L
APTT BLDCRRT: 53.2 SEC
BACTERIA UR CULT: NO GROWTH
BASOPHILS # BLD AUTO: 0.02 K/UL
BASOPHILS NFR BLD: 0.2 %
BUN SERPL-MCNC: 15 MG/DL
BUN SERPL-MCNC: 18 MG/DL
BUN SERPL-MCNC: 43 MG/DL
BUN SERPL-MCNC: 44 MG/DL
BUN SERPL-MCNC: 47 MG/DL
CALCIUM SERPL-MCNC: 10.2 MG/DL
CALCIUM SERPL-MCNC: 9.3 MG/DL
CALCIUM SERPL-MCNC: 9.5 MG/DL
CALCIUM SERPL-MCNC: 9.6 MG/DL
CALCIUM SERPL-MCNC: 9.9 MG/DL
CHLORIDE SERPL-SCNC: 100 MMOL/L
CHLORIDE SERPL-SCNC: 100 MMOL/L
CHLORIDE SERPL-SCNC: 101 MMOL/L
CHLORIDE SERPL-SCNC: 101 MMOL/L
CHLORIDE SERPL-SCNC: 99 MMOL/L
CO2 SERPL-SCNC: 20 MMOL/L
CO2 SERPL-SCNC: 22 MMOL/L
CO2 SERPL-SCNC: 24 MMOL/L
CO2 SERPL-SCNC: 26 MMOL/L
CO2 SERPL-SCNC: 27 MMOL/L
CREAT SERPL-MCNC: 3.3 MG/DL
CREAT SERPL-MCNC: 4.1 MG/DL
CREAT SERPL-MCNC: 6.7 MG/DL
CREAT SERPL-MCNC: 6.8 MG/DL
CREAT SERPL-MCNC: 7.1 MG/DL
DIFFERENTIAL METHOD: ABNORMAL
EOSINOPHIL # BLD AUTO: 0.1 K/UL
EOSINOPHIL NFR BLD: 0.4 %
ERYTHROCYTE [DISTWIDTH] IN BLOOD BY AUTOMATED COUNT: 19.2 %
EST. GFR  (AFRICAN AMERICAN): 14 ML/MIN/1.73 M^2
EST. GFR  (AFRICAN AMERICAN): 19 ML/MIN/1.73 M^2
EST. GFR  (AFRICAN AMERICAN): 7 ML/MIN/1.73 M^2
EST. GFR  (AFRICAN AMERICAN): 8 ML/MIN/1.73 M^2
EST. GFR  (AFRICAN AMERICAN): 8 ML/MIN/1.73 M^2
EST. GFR  (NON AFRICAN AMERICAN): 12 ML/MIN/1.73 M^2
EST. GFR  (NON AFRICAN AMERICAN): 16 ML/MIN/1.73 M^2
EST. GFR  (NON AFRICAN AMERICAN): 6 ML/MIN/1.73 M^2
EST. GFR  (NON AFRICAN AMERICAN): 7 ML/MIN/1.73 M^2
EST. GFR  (NON AFRICAN AMERICAN): 7 ML/MIN/1.73 M^2
FACT X PPP CHRO-ACNC: 0.37 IU/ML
GLUCOSE SERPL-MCNC: 129 MG/DL
GLUCOSE SERPL-MCNC: 129 MG/DL
GLUCOSE SERPL-MCNC: 132 MG/DL
GLUCOSE SERPL-MCNC: 132 MG/DL
GLUCOSE SERPL-MCNC: 78 MG/DL
GLUCOSE SERPL-MCNC: 78 MG/DL
GLUCOSE SERPL-MCNC: 79 MG/DL
GLUCOSE SERPL-MCNC: 79 MG/DL
GLUCOSE SERPL-MCNC: 84 MG/DL
GLUCOSE SERPL-MCNC: 84 MG/DL
HBV SURFACE AG SERPL QL IA: NEGATIVE
HCT VFR BLD AUTO: 24.5 %
HGB BLD-MCNC: 7.9 G/DL
INR PPP: 1.1
LYMPHOCYTES # BLD AUTO: 1 K/UL
LYMPHOCYTES NFR BLD: 9.1 %
MAGNESIUM SERPL-MCNC: 1.9 MG/DL
MAGNESIUM SERPL-MCNC: 2 MG/DL
MAGNESIUM SERPL-MCNC: 2.2 MG/DL
MAGNESIUM SERPL-MCNC: 2.3 MG/DL
MAGNESIUM SERPL-MCNC: 2.5 MG/DL
MCH RBC QN AUTO: 27.9 PG
MCHC RBC AUTO-ENTMCNC: 32.2 G/DL
MCV RBC AUTO: 87 FL
MONOCYTES # BLD AUTO: 0.6 K/UL
MONOCYTES NFR BLD: 5.6 %
NEUTROPHILS # BLD AUTO: 9.6 K/UL
NEUTROPHILS NFR BLD: 84.7 %
NSE SERPL-MCNC: 24 NG/ML
PHOSPHATE SERPL-MCNC: 3.3 MG/DL
PHOSPHATE SERPL-MCNC: 4.9 MG/DL
PHOSPHATE SERPL-MCNC: 5.3 MG/DL
PHOSPHATE SERPL-MCNC: 5.8 MG/DL
PHOSPHATE SERPL-MCNC: 6.7 MG/DL
PLATELET # BLD AUTO: 206 K/UL
PMV BLD AUTO: 9.9 FL
POCT GLUCOSE: 102 MG/DL (ref 70–110)
POCT GLUCOSE: 106 MG/DL (ref 70–110)
POCT GLUCOSE: 112 MG/DL (ref 70–110)
POCT GLUCOSE: 114 MG/DL (ref 70–110)
POCT GLUCOSE: 127 MG/DL (ref 70–110)
POCT GLUCOSE: 130 MG/DL (ref 70–110)
POCT GLUCOSE: 131 MG/DL (ref 70–110)
POCT GLUCOSE: 136 MG/DL (ref 70–110)
POCT GLUCOSE: 145 MG/DL (ref 70–110)
POCT GLUCOSE: 145 MG/DL (ref 70–110)
POCT GLUCOSE: 64 MG/DL (ref 70–110)
POCT GLUCOSE: 67 MG/DL (ref 70–110)
POCT GLUCOSE: 72 MG/DL (ref 70–110)
POCT GLUCOSE: 75 MG/DL (ref 70–110)
POCT GLUCOSE: 75 MG/DL (ref 70–110)
POCT GLUCOSE: 79 MG/DL (ref 70–110)
POCT GLUCOSE: 81 MG/DL (ref 70–110)
POCT GLUCOSE: 81 MG/DL (ref 70–110)
POCT GLUCOSE: 83 MG/DL (ref 70–110)
POCT GLUCOSE: 85 MG/DL (ref 70–110)
POCT GLUCOSE: 87 MG/DL (ref 70–110)
POTASSIUM SERPL-SCNC: 4.1 MMOL/L
POTASSIUM SERPL-SCNC: 4.3 MMOL/L
POTASSIUM SERPL-SCNC: 4.3 MMOL/L
POTASSIUM SERPL-SCNC: 4.6 MMOL/L
POTASSIUM SERPL-SCNC: 4.7 MMOL/L
PROTHROMBIN TIME: 12 SEC
RBC # BLD AUTO: 2.83 M/UL
SODIUM SERPL-SCNC: 134 MMOL/L
SODIUM SERPL-SCNC: 137 MMOL/L
SODIUM SERPL-SCNC: 139 MMOL/L
SODIUM SERPL-SCNC: 140 MMOL/L
SODIUM SERPL-SCNC: 141 MMOL/L
VANCOMYCIN SERPL-MCNC: 12.4 UG/ML
WBC # BLD AUTO: 11.34 K/UL

## 2018-03-07 PROCEDURE — 25000003 PHARM REV CODE 250

## 2018-03-07 PROCEDURE — 80307 DRUG TEST PRSMV CHEM ANLYZR: CPT

## 2018-03-07 PROCEDURE — 84100 ASSAY OF PHOSPHORUS: CPT | Mod: 91

## 2018-03-07 PROCEDURE — 85025 COMPLETE CBC W/AUTO DIFF WBC: CPT

## 2018-03-07 PROCEDURE — 85610 PROTHROMBIN TIME: CPT

## 2018-03-07 PROCEDURE — 85730 THROMBOPLASTIN TIME PARTIAL: CPT

## 2018-03-07 PROCEDURE — 63600175 PHARM REV CODE 636 W HCPCS: Performed by: STUDENT IN AN ORGANIZED HEALTH CARE EDUCATION/TRAINING PROGRAM

## 2018-03-07 PROCEDURE — 25000003 PHARM REV CODE 250: Performed by: INTERNAL MEDICINE

## 2018-03-07 PROCEDURE — 25000003 PHARM REV CODE 250: Performed by: STUDENT IN AN ORGANIZED HEALTH CARE EDUCATION/TRAINING PROGRAM

## 2018-03-07 PROCEDURE — 80048 BASIC METABOLIC PNL TOTAL CA: CPT | Mod: 91

## 2018-03-07 PROCEDURE — 36415 COLL VENOUS BLD VENIPUNCTURE: CPT

## 2018-03-07 PROCEDURE — 85520 HEPARIN ASSAY: CPT

## 2018-03-07 PROCEDURE — 83520 IMMUNOASSAY QUANT NOS NONAB: CPT

## 2018-03-07 PROCEDURE — S0028 INJECTION, FAMOTIDINE, 20 MG: HCPCS | Performed by: INTERNAL MEDICINE

## 2018-03-07 PROCEDURE — 83735 ASSAY OF MAGNESIUM: CPT

## 2018-03-07 PROCEDURE — 94003 VENT MGMT INPAT SUBQ DAY: CPT

## 2018-03-07 PROCEDURE — 20000000 HC ICU ROOM

## 2018-03-07 PROCEDURE — 80202 ASSAY OF VANCOMYCIN: CPT

## 2018-03-07 PROCEDURE — 94761 N-INVAS EAR/PLS OXIMETRY MLT: CPT

## 2018-03-07 PROCEDURE — 80100016 HC MAINTENANCE HEMODIALYSIS

## 2018-03-07 RX ORDER — LABETALOL HYDROCHLORIDE 5 MG/ML
5 INJECTION, SOLUTION INTRAVENOUS ONCE
Status: COMPLETED | OUTPATIENT
Start: 2018-03-07 | End: 2018-03-07

## 2018-03-07 RX ORDER — LABETALOL HYDROCHLORIDE 5 MG/ML
10 INJECTION, SOLUTION INTRAVENOUS EVERY 4 HOURS PRN
Status: DISCONTINUED | OUTPATIENT
Start: 2018-03-07 | End: 2018-03-07

## 2018-03-07 RX ORDER — AMLODIPINE BESYLATE 5 MG/1
10 TABLET ORAL DAILY
Status: DISCONTINUED | OUTPATIENT
Start: 2018-03-07 | End: 2018-03-12 | Stop reason: HOSPADM

## 2018-03-07 RX ORDER — LABETALOL HYDROCHLORIDE 5 MG/ML
10 INJECTION, SOLUTION INTRAVENOUS EVERY 4 HOURS PRN
Status: DISCONTINUED | OUTPATIENT
Start: 2018-03-07 | End: 2018-03-12 | Stop reason: HOSPADM

## 2018-03-07 RX ADMIN — ACETAMINOPHEN 650 MG: 650 SOLUTION ORAL at 05:03

## 2018-03-07 RX ADMIN — PIPERACILLIN AND TAZOBACTAM 4.5 G: 4; .5 INJECTION, POWDER, LYOPHILIZED, FOR SOLUTION INTRAVENOUS; PARENTERAL at 05:03

## 2018-03-07 RX ADMIN — DEXTROSE MONOHYDRATE 12.5 G: 500 INJECTION PARENTERAL at 05:03

## 2018-03-07 RX ADMIN — AZITHROMYCIN MONOHYDRATE 500 MG: 500 INJECTION, POWDER, LYOPHILIZED, FOR SOLUTION INTRAVENOUS at 03:03

## 2018-03-07 RX ADMIN — ACETAMINOPHEN 650 MG: 650 SOLUTION ORAL at 12:03

## 2018-03-07 RX ADMIN — PROPOFOL 20 MCG/KG/MIN: 10 INJECTION, EMULSION INTRAVENOUS at 05:03

## 2018-03-07 RX ADMIN — FAMOTIDINE 20 MG: 10 INJECTION, SOLUTION INTRAVENOUS at 08:03

## 2018-03-07 RX ADMIN — DEXTROSE MONOHYDRATE 12.5 G: 500 INJECTION PARENTERAL at 01:03

## 2018-03-07 RX ADMIN — AMLODIPINE BESYLATE 10 MG: 5 TABLET ORAL at 01:03

## 2018-03-07 RX ADMIN — HEPARIN SODIUM AND DEXTROSE 16 UNITS/KG/HR: 10000; 5 INJECTION INTRAVENOUS at 05:03

## 2018-03-07 RX ADMIN — LABETALOL HYDROCHLORIDE 10 MG: 5 INJECTION, SOLUTION INTRAVENOUS at 09:03

## 2018-03-07 RX ADMIN — LABETALOL HYDROCHLORIDE 10 MG: 5 INJECTION, SOLUTION INTRAVENOUS at 02:03

## 2018-03-07 RX ADMIN — VANCOMYCIN HYDROCHLORIDE 1000 MG: 1 INJECTION, POWDER, LYOPHILIZED, FOR SOLUTION INTRAVENOUS at 12:03

## 2018-03-07 RX ADMIN — LABETALOL HYDROCHLORIDE 5 MG: 5 INJECTION, SOLUTION INTRAVENOUS at 11:03

## 2018-03-07 RX ADMIN — ACETAMINOPHEN 650 MG: 650 SOLUTION ORAL at 11:03

## 2018-03-07 RX ADMIN — HEPARIN SODIUM AND DEXTROSE 16 UNITS/KG/HR: 10000; 5 INJECTION INTRAVENOUS at 08:03

## 2018-03-07 NOTE — NURSING
Dr. Mendez notified pt's glucose steadily declining to 67. Ok to hold insulin infusion for now and continue to monitor glucose every hour.

## 2018-03-07 NOTE — PROCEDURES
Pt seen and examined on HD, tolerating procedure well  Review of Systems   Unable to perform ROS: Critical illness     ,     /79 HR 98  Physical Exam   Constitutional: She is intubated.   Cardiovascular: Normal rate and regular rhythm.  Exam reveals no friction rub.    No murmur heard.  Pulmonary/Chest: She is intubated. She has decreased breath sounds.   Abdominal: Soft. She exhibits no distension. There is no tenderness.   Musculoskeletal: She exhibits no edema.   MARIA R AVF       Recent Labs  Lab 03/06/18  1308 03/07/18  0343   WBC 13.36* 11.34   HGB 9.9* 7.9*   HCT 30.8* 24.5*     234 206       Recent Labs  Lab 03/07/18  0343 03/07/18  0836    139   K 4.3 4.3    99   CO2 27 26   BUN 44* 47*   CREATININE 7.1* 6.7*   CALCIUM 9.5 9.3     A/P  1. ESRD (N18.6 Z99.2) - usual HD on MWF with Dr. Collado in [facility]  Hd in progress     2. HTN (I10) - at home on Procardia 90, start NOrvasc 10 per NG tube and Labetalol PRN SBP>160  3. Anemia of chronic kidney disease treated with CHING (N18.9 D63.1) - No epogen in the settings of uncontrolled HTN and ACS    Recent Labs  Lab 03/06/18  0709 03/06/18  1308 03/07/18  0343   HGB 8.8* 9.9* 7.9*   HCT 27.3* 30.8* 24.5*    234  234 206     Lab Results   Component Value Date    IRON 10 (L) 12/20/2016    TIBC 184 (L) 12/20/2016    FERRITIN 935 (H) 12/20/2016       4. MBD (E88.9 M90.80) -    Recent Labs  Lab 03/07/18  0836   CALCIUM 9.3   PHOS 6.7*       Recent Labs  Lab 03/06/18  2321 03/07/18  0343 03/07/18  0836   MG 2.5 2.2 2.3     No results found for: [id number]    Lab Results   Component Value Date    CO2 26 03/07/2018     5. Hemodialysis Access (Z99.2 V45.11)- MARIA R AVF  6. Nutrition/Hypoalbuminemia (E88.09) - NPO for now    Recent Labs  Lab 03/05/18  1714  03/06/18  1308 03/07/18  0343   LABPROT  --   < > 10.9 12.0   ALBUMIN 3.5  --   --   --    < > = values in this interval not displayed.  ROSC --> completed hypothermia protocol,  cards, critical care follows, off propofol since this AM, so far not waking up and nor following commands    Total critical care time 35 minutes: included management of organ failures related to critical illness, titration of continuous infusions, review of pertinent labs and imaging studies, discussion with primary team      Thank you for allowing me to participate in care of your patient  With any question please call [phone]  Felisa Puga    [facility]  SHAI Collado MD, SHANE LEVY MD,   MD SCHUYLER Yarbrough, NP  [address]  [place], [address]  [phone]  After hours answering service: [phone]

## 2018-03-07 NOTE — PROGRESS NOTES
[facility] Internal Medicine Resident HO-II Progress Note    Subjective:      Per RN ICU, reports of low accuchecks overnight on insulin gtt, subsequently insulin held. No other overnight complaints. Patient on sedation and with ETT in place. Coming off cooling protocol.      Objective:   Last 24 Hour Vital Signs:  BP  Min: 125/64  Max: 204/98  Temp  Av.4 °F (36.3 °C)  Min: 95.9 °F (35.5 °C)  Max: 98.6 °F (37 °C)  Pulse  Av.6  Min: 72  Max: 84  Resp  Av.7  Min: 15  Max: 33  SpO2  Av.6 %  Min: 98 %  Max: 100 %  Weight  Av.4 kg (221 lb 5.5 oz)  Min: 100.4 kg (221 lb 5.5 oz)  Max: 100.4 kg (221 lb 5.5 oz)  I/O last 3 completed shifts:  In: 1762.6 [I.V.:712.6; Other:400; NG/GT:250; IV Piggyback:400]  Out: 762 [Urine:112; Drains:150; Other:400; Stool:100]    Physical Examination:  General: s/p intubation; currently on % O2 with PEEP 5. Vt 350. , obese BMI 35, coming off cooling protocol  Head: normocephalic, atraumatic   Eyes: left pupil 3mm, not reactive to light. Right eye with pterygoid process. +Dolls eye sign  Ears, Nose, Throat:  without rhinorrhea, OP obscured by ETT placement. NG in place.   Neck: supple, without thyromegaly  Cardiovascular: NRRR, without murmurs or rubs, without extra heart sounds, right UE fistula with good thrill  Pulmonary: rhonchi heard over RUL, mechanical breath sounds  Abdomen: soft, nontender, nondistended, cooling pads in place  MSKL: without gross deformities; passive ROM achieved  Lymphatic: no appreciated cervical or axillary LAD  Skin: without cyanosis, clubbing, or edema   Neurologic: intubated. Absent corneal reflex, absent light reflex, does not withdraw to pain. +gag.       Laboratory:  Laboratory Data Reviewed: yes  Pertinent Findings:    Recent Labs  Lab 18  1714  18  0709  18  1308  18  1917 18  2321 18  0343   WBC 17.93*  --  11.05  --  13.36*  --   --   --  11.34   HGB 9.2*  --  8.8*  --  9.9*  --   --   --  7.9*   HCT  29.7*  < > 27.3*  --  30.8*  --   --   --  24.5*     --  227  --  234  234  --   --   --  206   MCV 92  --  88  --  87  --   --   --  87   RDW 19.4*  --  18.7*  --  19.0*  --   --   --  19.2*   *  < > 144  < >  --   --  138 140 141   K 5.2*  < > 5.3*  < >  --   < > 4.5 4.6 4.3     < > 105  < >  --   --  100 101 100   CO2 17*  < > 22*  < >  --   --  25 24 27   BUN 58*  < > 69*  < >  --   --  42* 43* 44*   CREATININE 9.8*  < > 10.0*  < >  --   --  6.2* 6.8* 7.1*   *  < > 290*  290*  < >  --   --  203*  203* 129*  129* 79  79   PROT 8.3  --   --   --   --   --   --   --   --    ALBUMIN 3.5  --   --   --   --   --   --   --   --    BILITOT 0.5  --   --   --   --   --   --   --   --    *  --   --   --   --   --   --   --   --    ALKPHOS 140*  --   --   --   --   --   --   --   --    *  --   --   --   --   --   --   --   --    < > = values in this interval not displayed.      Microbiology Data Reviewed: yes  Pertinent Findings:  Blood culture x2; NGTD  Respiratory culture from sputum; not resulted  Urine culture pending      Other Results:  EKG (my interpretation): NSR    Radiology Data Reviewed: yes  Pertinent Findings:       Current Medications:     Infusions:   heparin (porcine) in D5W 16 Units/kg/hr (03/07/18 0503)    insulin (HUMAN R) infusion (adults) Stopped (03/07/18 0147)    propofol 20 mcg/kg/min (03/07/18 0502)        Scheduled:   sodium chloride 0.9%   Intravenous Once    acetaminophen  650 mg Per NG tube Q6H    azithromycin  500 mg Intravenous Q24H    famotidine (PF)  20 mg Intravenous Daily    piperacillin-tazobactam (ZOSYN) IVPB  4.5 g Intravenous Q12H    sodium polystyrene  15 g Oral Once    vancomycin (VANCOCIN) IVPB  1,000 mg Intravenous Once in dialysis        PRN:  sodium chloride 0.9%, dextrose 50%, dextrose 50%, glucagon (human recombinant), glucose, glucose, heparin (PORCINE), heparin (PORCINE), insulin aspart U-100, sodium chloride  0.9%    Antibiotics and Day Number of Therapy:  Vancomycin day 2  Zosyn day 2  Azithromycin day 1    Lines and Day Number of Therapy:  PIVx2.   Port a cath single lumen  Rectal tube  NG/OG tube  ETT 3/5    Assessment:     Osiel Moeller is a 43 y.o.female with  Patient Active Problem List    Diagnosis Date Noted    Acute respiratory failure with hypoxia and hypercapnia 03/05/2018    Pneumonia of both lungs due to infectious organism 12/22/2016    Type 2 diabetes mellitus with complication 12/21/2016    ESRD on dialysis     Volume overload 12/19/2016    Pneumonia 12/19/2016    Vision loss 11/29/2016    Palpitations 11/22/2016    Epigastric pain 11/16/2016    Chronic congestive heart failure 11/08/2016    Dyspnea 10/31/2016    HTN (hypertension) 10/31/2016    Diabetes 1.5, 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   - follow up pulm recs; appreciate assistance     #) severe sepsis likely due to aspiration pneumonia (RUL) with acute encephalopathy  - as above; trending lactates, continuing IV abx with vancomycin and zosyn. Added azithromycin with atypical coverage.   - CT head negative for acute process  - on propofol for sedation w/ coming off cooling protocol     #) post cardiac arrest  - on cooling protocol per pulm/crit and cardiology  - echo w/ no wall motion abnormalities, low normal EF 50-55%, grade II diastolic dysfunction, pulm HTN, increased CVP, trended  troponin and EKG.  - poor prognosis; ongoing family discussion     #) troponinemia  - frederick from 0.058 to 0.376; cardiology following, likely secondary to arrest  - EKG without ST changes     #) CKDV on HD  - MWF dialysis, nephrology consulted for HD today; graft with good thrill on exam     #) Heart failure with preserved ejection fraciton  -  on admission, euvolemic on presentation  - will monitor; repeated echocardiogram as above.     #) DM type II  - covering with sliding scale for now     #) HTN  - holding home anti-htn in setting of critical illness  - restarted labetalol PRN for SBP>180.     #) elevated transaminates  - likely elevated s/p cardiac arrest.      #) normocytic anemia  - h/h 9.2/29.7 on admission; monitoring  - trended down to 7.9/24.5 this am.      Diet: npo  Code: full - ongoing family discussion with SonChip in terms of code status.   DVTppx: on heparin gtt for anticoagulation     Dispo: continued ICU care. Critically ill. Poor prognosis       Armani Vines  [facility] Internal Medicine HO-II  [facility] Internal Medicine Service Team B     [facility] Medicine Hospitalist Pager numbers:   [facility] Hospitalist Medicine Team A (Sarah/Venkat): [phone]  [facility] Hospitalist Medicine Team B (Genesis/Nathalie):  [phone]

## 2018-03-07 NOTE — PLAN OF CARE
Problem: Patient Care Overview  Goal: Plan of Care Review  Outcome: Ongoing (interventions implemented as appropriate)  Pt's SpO2 99% on mechanical ventilator settings AC 22 RR/ 355 VT/ 5 PEEP/ 21% FiO2. No respiratory distress noted. Will continue to monitor SpO2.

## 2018-03-07 NOTE — PLAN OF CARE
Problem: Patient Care Overview  Goal: Plan of Care Review  Pt received on settings as noted on vent flow sheet, vent alarms audible and functioning. Ambu bag at [facility], no respiratory distress noted. Will cont to monitor.

## 2018-03-07 NOTE — PROGRESS NOTES
[facility] Resident HO-II Progress Note - Pulmonology    Subjective:     Rewarmed yesterday and vitals remained stable.  Some hypoglycemia with insulin, gtt now stopped.  2 hours HD performed.    Objective:   Last 24 Hour Vital Signs:  BP  Min: 127/63  Max: 204/98  Temp  Av.3 °F (36.3 °C)  Min: 95.9 °F (35.5 °C)  Max: 98.6 °F (37 °C)  Pulse  Av.2  Min: 72  Max: 84  Resp  Av.5  Min: 19  Max: 33  SpO2  Av.6 %  Min: 98 %  Max: 100 %  Weight  Av.4 kg (221 lb 5.5 oz)  Min: 100.4 kg (221 lb 5.5 oz)  Max: 100.4 kg (221 lb 5.5 oz)    Intake/Output Summary (Last 24 hours) at 18 0700  Last data filed at 18 0600   Gross per 24 hour   Intake          1295.51 ml   Output              497 ml   Net           798.51 ml     Physical Examination:  General: intubated, sedated, supine in bed, obese, on HD  HEENT: R eye cloudy and scarred, L pupil nonreactive, ETT in place, MMM  Neck: supple, trachea midline  Chest: R chest wall port without fluctuance or overlying erythema  CV: RRR, no murmur appreciated  Resp: mechanical breath sounds bilaterally, on /22/21%/5  Abd: soft, NT, ND, normoactive BS  Extrem: symmetric, no c/c/e  : Marie in place with minimal yellow urine and temperature probe, rectal tube with normal stool  Skin: WWP, healed surgical scar over L knee, mild skin breakdown over L calf  Neuro: intubated, sedated on propofol, nonpurposeful eyelid movement, pupils nonreactive, no gag reflex, no corneal reflex, no withdrawal from pain    Laboratory:  Laboratory Data:   No results for input(s): PH, PCO2, PO2, HCO3, POCSATURATED, BE in the last 24 hours.    Recent Labs  Lab 18  0343   WBC 11.34   RBC 2.83*   HGB 7.9*   HCT 24.5*      MCV 87   MCH 27.9   MCHC 32.2       Recent Labs  Lab 18  0343      K 4.3      CO2 27   BUN 44*   CREATININE 7.1*   MG 2.2     Microbiology Data:  BCx (3/5) NGTD  UCx (3/5) NGTD    Other Results:  Radiology Data:   CT head  (3/6) - no evidence of acute intracranial hemorrhage or midline shift    2D Echo:   3/6/2018 - EF 50-55%, no WMA, significant diastolic dysfunction, PAP 46mmHg    Current Medications:     Infusions:   heparin (porcine) in D5W 16 Units/kg/hr (03/07/18 0503)    insulin (HUMAN R) infusion (adults) Stopped (03/07/18 0147)    propofol 20 mcg/kg/min (03/07/18 0502)        Scheduled:   sodium chloride 0.9%   Intravenous Once    acetaminophen  650 mg Per NG tube Q6H    azithromycin  500 mg Intravenous Q24H    famotidine (PF)  20 mg Intravenous Daily    piperacillin-tazobactam (ZOSYN) IVPB  4.5 g Intravenous Q12H    sodium polystyrene  15 g Oral Once        PRN:  sodium chloride 0.9%, dextrose 50%, dextrose 50%, glucagon (human recombinant), glucose, glucose, heparin (PORCINE), heparin (PORCINE), insulin aspart U-100, sodium chloride 0.9%    Antibiotics and Day Number of Therapy:  Vancomycin, piperacillin-tazobactam (Day 3)    Lines and Day Number of Therapy:  PIV L  Rectal tube  OGT  Marie  ETT  R chest port    Assessment/Plan:     Osiel Moeller is a 43 y.o.female with    Acute Hypoxic and Hypercapnic Respiratory Failure   - continue ventilation per ARDSnet protocol  not difficult to ventilate  - stop sedation today if able     S/p Cardiac Arrest  - TTC for total 72 hours  prognostication after completion  - etiology still unclear; consider arrhythmia (despite K normal on admit) vs PE vs aspiration with significant hypoxia  - continue empiric heparin     RUL Aspiration and Atelectasis  - CXR with mediastinal shift, likely atelectasis from airway obstruction  - continue broad spectrum abx with vanc/zosyn     Essential Hypertension  - continue labetalol PRN to maintain SBP<180     ESRD on HD  - continue dialysis per Nephrology     DM2 with Hyperglycemia  - goal -180  may have to consider alternative to gtt, as hospital algorithm does not fit goal      Viviane Justin  [facility]  HO-II  [facility] Pulmonology Service

## 2018-03-07 NOTE — PLAN OF CARE
Problem: Hemodialysis (Adult)  Goal: Signs and Symptoms of Listed Potential Problems Will be Absent, Minimized or Managed (Hemodialysis)  Signs and symptoms of listed potential problems will be absent, minimized or managed by discharge/transition of care (reference Hemodialysis (Adult) CPG).    03/07/18 0848   Hemodialysis   Problems Assessed (Hemodialysis) electrolyte imbalance;fluid imbalance   Problems Present (Hemodialysis) electrolyte imbalance;fluid imbalance     HD with UF

## 2018-03-07 NOTE — NURSING
Dr. Mendez notified pt's last two glucose 172 and 145. No new orders received at this time for insulin drip or maintenance fluids. Will continue to monitor.

## 2018-03-07 NOTE — MEDICAL/APP STUDENT
[facility]  Pulmonology  Progress Note    Patient Name: Osiel Moeller  MRN: [id number]  Admission Date: 3/5/2018  Hospital Length of Stay: 2 days  Code Status: Full Code  Attending Provider: Eddie Zelaya MD  Primary Care Provider: Moises Long MD   Principal Problem:  Cardiac Arrest    Subjective:     Overnight events: Rewarmed, Afebrile. Hypoglycemic overnight requiring two doses of 12.5 g D5; insulin drip turned off. Propofol turned off, Passed SBT. HD started this am.     Objective:     Vital Signs (Most Recent):  Temp: 98.6 °F (37 °C) (03/07/18 0600)  Pulse: 73 (03/07/18 0821)  Resp: 15 (03/07/18 0821)  BP: 127/63 (03/07/18 0600)  SpO2: 99 % (03/07/18 0821) Vital Signs (24h Range):  Temp:  [95.9 °F (35.5 °C)-98.6 °F (37 °C)] 98.6 °F (37 °C)  Pulse:  [72-84] 73  Resp:  [15-33] 15  SpO2:  [98 %-100 %] 99 %  BP: (127-204)/(63-98) 127/63     Weight: 100.4 kg (221 lb 5.5 oz)  Body mass index is 34.67 kg/m².    Intake/Output Summary (Last 24 hours) at 03/07/18 0841  Last data filed at 03/07/18 0600   Gross per 24 hour   Intake          1295.51 ml   Output              497 ml   Net           798.51 ml       Physical Exam   Constitutional: She appears ill. She is intubated.   Eyes: Left pupil is not reactive. Pupils are unequal.   Right eye irregular and cloudy; unable to visualize pupil    Neck: Neck supple. No tracheal deviation present.   Cardiovascular: Normal rate, regular rhythm and normal heart sounds.    Pulses:       Radial pulses are 2+ on the right side, and 2+ on the left side.        Dorsalis pedis pulses are 2+ on the right side, and 2+ on the left side.        Posterior tibial pulses are 1+ on the right side, and 1+ on the left side.   Pulmonary/Chest: She is intubated. She has decreased breath sounds in the right lower field and the left lower field.   Abdominal: Soft. Bowel sounds are decreased.   Rectal tube in place   Genitourinary:   Genitourinary Comments:  Marie in place with minimal amount of clear yellow urine.    Musculoskeletal:   Right upper arm fistula positive for thrill and bruit.  No spontaneous movements; generally flaccid.   No response to noxious stimuli.   Neurological: She is unresponsive. GCS eye subscore is 1. GCS verbal subscore is 1. GCS motor subscore is 1.   Non purposeful left eye movements; Left pupil 3mm fixed.  (AMANDA right pupil s/t eye irregularity scarring and cloudiness);   Absent corneal, gag and cough reflex.  no response to noxious stimuli.     Skin: Skin is warm and dry. Capillary refill takes less than 2 seconds.   Healed scar over left knee  Skin breakdown on left calf; foam dressings intact  +2 generalized edema.    Vitals reviewed.    Vents as of 03/07, 0821  Vent Mode: AC  Set Rate: 22 bmp   Vt Set: 350 mL )  Pressure Support: 0 cmH20  PEEP/CPAP: 5 cmH20 (03/07/18 0821)  Oxygen Concentration (%): 21   Peak Airway Pressure: 23 cmH2O  Plateau Pressure: 20 cmH20  Total Ve: 8.23 mL  F/VT Ratio<105 (RSBI): (!) 39.47     Lines/Drains/Airways     Central Venous Catheter Line                 Port A Cath Single Lumen -- days          Drain                 Hemodialysis AV Fistula Right upper arm -- days         NG/OG Tube 03/05/18 1737 16 Fr. Right mouth 1 day         Rectal Tube 03/05/18 2200 rectal tube w/ balloon (indicate number of mLs) 1 day         Urethral Catheter 03/05/18 2030 Temperature probe 1 day          Airway                 Airway - Non-Surgical 03/05/18 1713 Endotracheal Tube 1 day          Peripheral Intravenous Line                 Peripheral IV - Single Lumen 03/05/18 1713 Left Forearm 1 day         Peripheral IV - Single Lumen 03/06/18 2300 Left Other less than 1 day                Significant Labs:    CBC/Anemia Profile:    Recent Labs  Lab 03/06/18  0709 03/06/18  1308 03/07/18  0343   WBC 11.05 13.36* 11.34   HGB 8.8* 9.9* 7.9*   HCT 27.3* 30.8* 24.5*    234  234 206   MCV 88 87 87   RDW 18.7* 19.0* 19.2*         Chemistries:    Recent Labs  Lab 03/05/18  1714  03/06/18  1917 03/06/18  1926 03/06/18  2321 03/06/18  2328 03/07/18  0343   *  < > 138  --  140  --  141   K 5.2*  < > 4.5  --  4.6  --  4.3     < > 100  --  101  --  100   CO2 17*  < > 25  --  24  --  27   BUN 58*  < > 42*  --  43*  --  44*   CREATININE 9.8*  < > 6.2*  --  6.8*  --  7.1*   CALCIUM 10.3  < > 9.4  --  9.6  --  9.5   ALBUMIN 3.5  --   --   --   --   --   --    PROT 8.3  --   --   --   --   --   --    BILITOT 0.5  --   --   --   --   --   --    ALKPHOS 140*  --   --   --   --   --   --    *  --   --   --   --   --   --    *  --   --   --   --   --   --    MG 2.6  < > 2.5  --  2.5  --  2.2   PHOS  --   < >  --  4.6*  --  5.3* 5.8*   < > = values in this interval not displayed.    ABGs:   Recent Labs  Lab 03/06/18  0101   PH 7.497*   PCO2 37.3   HCO3 28.9*   POCSATURATED 100   BE 6     Coagulation:   Recent Labs  Lab 03/07/18  0343   INR 1.1   APTT 53.2*     Lactic Acid:   Recent Labs  Lab 03/05/18  1743 03/06/18  1308   LACTATE 7.5* 2.4*     POCT Glucose:   Recent Labs  Lab 03/07/18  0608 03/07/18  0730 03/07/18  0815   POCTGLUCOSE 127* 102 85     Urine Culture: No results for input(s): LABURIN in the last 48 hours.    Significant Imaging:  CXR (03/05/2018)  I have reviewed all pertinent results/findings within the past 24 hours and my personal findings are:   -ETT above babak, port-a-cath to right chest, NGT in gastric lumen  -Evidence of upper lobe consolidation and atelectasis, mediastinal shift and traction elevation of right hemidiaphragm  -Right upper and middle lung consolidation concerning for aspiration or pneumonia      2D ECHO (3/06/2018)   -Moderate left atrial enlargement  -Low to mildly depressed LV systolic function (EF 50-55%)  -impaired LV relaxation (grade 2 diastolic dysfunction)  -Pulmonary HTN (PA Systolic 46 mmHg)  -Mild Mitral regurgitation  -Increased CVP      CT HEAD without contrast (03/06/2018)  -No  evidence of acute intracranial process.      EKG(03/05/18)  -NSR, prolonged Qtc    Assessment/Plan:   Osiel Moeller is a 44 y/o female who is s/p cardiac arrest. Remains intubated, sedation off this am.       NEURO  Acute Encephalopathy  Concerned for anoxic brain injury in the setting of cardiac arrest; CT head was unremarkable for acute intracranial process. Re-warmed overnight to normothermia, afebrile- maintain at normothermia. Still not following commands; no gag, cough or corneal; breathing over the vent  -Plan for EEG tomorrow.      CARDIAC  S/p Cardiac Arrest:    Etiology of arrest unknown; no evidence suggesting ACS or systolic dysfunction as trigger; Elevated Troponin on admit but trending down, likely s/t cardiac contusion from code. Concern of PE as possible cause of arrest although unlikely s/t no evidence of RV strain on echo.   -Continue heparin gtts    Hypertension   Normotensive in 120s-140s overnight, but hypertensive this am on HD  -Consider starting on home dose nifedipine  -PRN Labetalol for BG goal SBP <180.    HFpEF  EF 50-55% today, pulmonary HTN, and left atrial enlargement.  on admit likely reflective of left atrial enlargement and + fluid volume status s/t missed HD.   -Nephrology following: HD yesterday and today.       RESPIRATORY  Acute Respiratory Failure with Hypoxia and Hypercarbia  Evidence of upper lobe consolidation and atelectasis with mediastinal shift and traction elevation of right hemidiaphragm on chest x-ray concerning for aspiration or pneumonia. Most likely aspiration related in the setting of cardiac arrest; RN suctioned piece of chicken out of ETT yesterday. Passed SBT this am, neurologic status not compatible with extubation at this time. Oxygenating well on AC 22//P5/FIO2 30%  -Continue current vent settings.  -aggressive pulmonary toileting; azithromycin/zosyn for CAP coverage.     RENAL   ESRD  Known dialysis patient (MWF); right upper arm fistula  present and patent:   -Nephrology following; dialyzed yesterday.   -Plan for 3 hours HD today.      GI  Diarrhea, resolved.   -Fecal management system in use to prevent skin breakdown.     ENDOCRINE  Diabetes Mellitus Type 2  HgbA1c 6.1. Hypoglycemia overnight.  -Consider sliding scale insluin and Q4 HR accuchecks     HEME/IMMUNE  Leukocytosis and lactic acidosis likely s/t acute event: trended down.  -Continue broad spectrum CAP coverage with azithromycin/zosyn; aggressive pulmonary toileting.   -Chronic anemia likely s/t CKD, will monitor for now.      PROPHYLAXIS  PUD- Pepcid  DVT- Heparin gtts     Frida House  [place] AG-ACNP Student, Pulmonology  [facility]

## 2018-03-07 NOTE — PLAN OF CARE
Problem: Patient Care Overview  Goal: Plan of Care Review  Outcome: Ongoing (interventions implemented as appropriate)  Pt lying comfortably in bed with spontaneous eye movements. Response to pain but does not withdraw. No gag or corneal reflex but does cough when stimulated. Moderate secretions from mouth and nose. OG with 50ml of thick tan output. Respirations even and unlabored. SATS % on AC 22/355/5/21%. Tele NSR with HR 70's. BP stable during rewarming phase. Urine output 0-5ml/hr. No output from rectal tube. Pt hypoglycemic after insulin drip stopped. PRN D50 given x2. PTT within normal limits on heparin drip. Family at bedside. Will continue to monitor.

## 2018-03-08 LAB
ANION GAP SERPL CALC-SCNC: 13 MMOL/L
ANION GAP SERPL CALC-SCNC: 14 MMOL/L
ANION GAP SERPL CALC-SCNC: 15 MMOL/L
ANION GAP SERPL CALC-SCNC: 15 MMOL/L
APTT BLDCRRT: 45.5 SEC
BASOPHILS # BLD AUTO: 0.04 K/UL
BASOPHILS NFR BLD: 0.4 %
BUN SERPL-MCNC: 21 MG/DL
BUN SERPL-MCNC: 24 MG/DL
BUN SERPL-MCNC: 25 MG/DL
BUN SERPL-MCNC: 28 MG/DL
CALCIUM SERPL-MCNC: 9.4 MG/DL
CALCIUM SERPL-MCNC: 9.7 MG/DL
CALCIUM SERPL-MCNC: 9.7 MG/DL
CALCIUM SERPL-MCNC: 9.8 MG/DL
CHLORIDE SERPL-SCNC: 100 MMOL/L
CHLORIDE SERPL-SCNC: 97 MMOL/L
CHLORIDE SERPL-SCNC: 98 MMOL/L
CHLORIDE SERPL-SCNC: 98 MMOL/L
CO2 SERPL-SCNC: 21 MMOL/L
CO2 SERPL-SCNC: 22 MMOL/L
CREAT SERPL-MCNC: 4.5 MG/DL
CREAT SERPL-MCNC: 4.7 MG/DL
CREAT SERPL-MCNC: 4.9 MG/DL
CREAT SERPL-MCNC: 5.2 MG/DL
DIFFERENTIAL METHOD: ABNORMAL
EOSINOPHIL # BLD AUTO: 0.3 K/UL
EOSINOPHIL NFR BLD: 2.7 %
ERYTHROCYTE [DISTWIDTH] IN BLOOD BY AUTOMATED COUNT: 19.1 %
EST. GFR  (AFRICAN AMERICAN): 11 ML/MIN/1.73 M^2
EST. GFR  (AFRICAN AMERICAN): 12 ML/MIN/1.73 M^2
EST. GFR  (AFRICAN AMERICAN): 12 ML/MIN/1.73 M^2
EST. GFR  (AFRICAN AMERICAN): 13 ML/MIN/1.73 M^2
EST. GFR  (NON AFRICAN AMERICAN): 10 ML/MIN/1.73 M^2
EST. GFR  (NON AFRICAN AMERICAN): 11 ML/MIN/1.73 M^2
EST. GFR  (NON AFRICAN AMERICAN): 11 ML/MIN/1.73 M^2
EST. GFR  (NON AFRICAN AMERICAN): 9 ML/MIN/1.73 M^2
FACT X PPP CHRO-ACNC: 0.31 IU/ML
GLUCOSE SERPL-MCNC: 133 MG/DL
GLUCOSE SERPL-MCNC: 133 MG/DL
GLUCOSE SERPL-MCNC: 159 MG/DL
GLUCOSE SERPL-MCNC: 159 MG/DL
GLUCOSE SERPL-MCNC: 192 MG/DL
GLUCOSE SERPL-MCNC: 192 MG/DL
GLUCOSE SERPL-MCNC: 210 MG/DL
GLUCOSE SERPL-MCNC: 210 MG/DL
GLUCOSE SERPL-MCNC: 460 MG/DL
HCT VFR BLD AUTO: 27.8 %
HGB BLD-MCNC: 8.9 G/DL
INR PPP: 1
LYMPHOCYTES # BLD AUTO: 0.8 K/UL
LYMPHOCYTES NFR BLD: 8.5 %
MAGNESIUM SERPL-MCNC: 2.1 MG/DL
MAGNESIUM SERPL-MCNC: 2.2 MG/DL
MCH RBC QN AUTO: 28.2 PG
MCHC RBC AUTO-ENTMCNC: 32 G/DL
MCV RBC AUTO: 88 FL
MONOCYTES # BLD AUTO: 0.8 K/UL
MONOCYTES NFR BLD: 8.8 %
NEUTROPHILS # BLD AUTO: 7.4 K/UL
NEUTROPHILS NFR BLD: 79.4 %
NSE SERPL-MCNC: 55 NG/ML
PHOSPHATE SERPL-MCNC: 5.6 MG/DL
PHOSPHATE SERPL-MCNC: 6 MG/DL
PHOSPHATE SERPL-MCNC: 6.3 MG/DL
PHOSPHATE SERPL-MCNC: 6.6 MG/DL
PLATELET # BLD AUTO: 183 K/UL
PMV BLD AUTO: 10.2 FL
POCT GLUCOSE: 129 MG/DL (ref 70–110)
POCT GLUCOSE: 144 MG/DL (ref 70–110)
POCT GLUCOSE: 155 MG/DL (ref 70–110)
POCT GLUCOSE: 168 MG/DL (ref 70–110)
POCT GLUCOSE: 178 MG/DL (ref 70–110)
POCT GLUCOSE: 180 MG/DL (ref 70–110)
POCT GLUCOSE: 185 MG/DL (ref 70–110)
POCT GLUCOSE: 187 MG/DL (ref 70–110)
POCT GLUCOSE: 194 MG/DL (ref 70–110)
POCT GLUCOSE: 195 MG/DL (ref 70–110)
POCT GLUCOSE: 226 MG/DL (ref 70–110)
POTASSIUM SERPL-SCNC: 5.2 MMOL/L
POTASSIUM SERPL-SCNC: 5.2 MMOL/L
POTASSIUM SERPL-SCNC: 5.3 MMOL/L
POTASSIUM SERPL-SCNC: 5.3 MMOL/L
PROTHROMBIN TIME: 10.7 SEC
RBC # BLD AUTO: 3.16 M/UL
SODIUM SERPL-SCNC: 133 MMOL/L
SODIUM SERPL-SCNC: 133 MMOL/L
SODIUM SERPL-SCNC: 134 MMOL/L
SODIUM SERPL-SCNC: 135 MMOL/L
WBC # BLD AUTO: 9.38 K/UL

## 2018-03-08 PROCEDURE — 25000003 PHARM REV CODE 250: Performed by: STUDENT IN AN ORGANIZED HEALTH CARE EDUCATION/TRAINING PROGRAM

## 2018-03-08 PROCEDURE — 85025 COMPLETE CBC W/AUTO DIFF WBC: CPT

## 2018-03-08 PROCEDURE — 63600175 PHARM REV CODE 636 W HCPCS: Performed by: STUDENT IN AN ORGANIZED HEALTH CARE EDUCATION/TRAINING PROGRAM

## 2018-03-08 PROCEDURE — 85730 THROMBOPLASTIN TIME PARTIAL: CPT

## 2018-03-08 PROCEDURE — 94003 VENT MGMT INPAT SUBQ DAY: CPT

## 2018-03-08 PROCEDURE — 85520 HEPARIN ASSAY: CPT

## 2018-03-08 PROCEDURE — 20000000 HC ICU ROOM

## 2018-03-08 PROCEDURE — 63600175 PHARM REV CODE 636 W HCPCS: Performed by: INTERNAL MEDICINE

## 2018-03-08 PROCEDURE — 25000003 PHARM REV CODE 250: Performed by: INTERNAL MEDICINE

## 2018-03-08 PROCEDURE — 95822 EEG COMA OR SLEEP ONLY: CPT

## 2018-03-08 PROCEDURE — 83735 ASSAY OF MAGNESIUM: CPT | Mod: 91

## 2018-03-08 PROCEDURE — 80100016 HC MAINTENANCE HEMODIALYSIS

## 2018-03-08 PROCEDURE — 95822 EEG COMA OR SLEEP ONLY: CPT | Mod: 26,,, | Performed by: PSYCHIATRY & NEUROLOGY

## 2018-03-08 PROCEDURE — 80048 BASIC METABOLIC PNL TOTAL CA: CPT

## 2018-03-08 PROCEDURE — 94761 N-INVAS EAR/PLS OXIMETRY MLT: CPT

## 2018-03-08 PROCEDURE — 36415 COLL VENOUS BLD VENIPUNCTURE: CPT

## 2018-03-08 PROCEDURE — 83520 IMMUNOASSAY QUANT NOS NONAB: CPT

## 2018-03-08 PROCEDURE — 82947 ASSAY GLUCOSE BLOOD QUANT: CPT

## 2018-03-08 PROCEDURE — S0028 INJECTION, FAMOTIDINE, 20 MG: HCPCS | Performed by: INTERNAL MEDICINE

## 2018-03-08 PROCEDURE — 84100 ASSAY OF PHOSPHORUS: CPT | Mod: 91

## 2018-03-08 PROCEDURE — 85610 PROTHROMBIN TIME: CPT

## 2018-03-08 RX ORDER — INSULIN ASPART 100 [IU]/ML
0-5 INJECTION, SOLUTION INTRAVENOUS; SUBCUTANEOUS EVERY 6 HOURS PRN
Status: DISCONTINUED | OUTPATIENT
Start: 2018-03-08 | End: 2018-03-12 | Stop reason: HOSPADM

## 2018-03-08 RX ORDER — GLUCAGON 1 MG
1 KIT INJECTION
Status: DISCONTINUED | OUTPATIENT
Start: 2018-03-08 | End: 2018-03-12 | Stop reason: HOSPADM

## 2018-03-08 RX ORDER — SODIUM CHLORIDE 9 MG/ML
INJECTION, SOLUTION INTRAVENOUS
Status: DISCONTINUED | OUTPATIENT
Start: 2018-03-08 | End: 2018-03-12 | Stop reason: HOSPADM

## 2018-03-08 RX ORDER — SODIUM CHLORIDE 9 MG/ML
INJECTION, SOLUTION INTRAVENOUS ONCE
Status: DISCONTINUED | OUTPATIENT
Start: 2018-03-08 | End: 2018-03-12 | Stop reason: HOSPADM

## 2018-03-08 RX ADMIN — PIPERACILLIN AND TAZOBACTAM 4.5 G: 4; .5 INJECTION, POWDER, LYOPHILIZED, FOR SOLUTION INTRAVENOUS; PARENTERAL at 06:03

## 2018-03-08 RX ADMIN — AZITHROMYCIN MONOHYDRATE 500 MG: 500 INJECTION, POWDER, LYOPHILIZED, FOR SOLUTION INTRAVENOUS at 04:03

## 2018-03-08 RX ADMIN — AMLODIPINE BESYLATE 10 MG: 5 TABLET ORAL at 08:03

## 2018-03-08 RX ADMIN — HEPARIN SODIUM AND DEXTROSE 16 UNITS/KG/HR: 10000; 5 INJECTION INTRAVENOUS at 02:03

## 2018-03-08 RX ADMIN — LABETALOL HYDROCHLORIDE 10 MG: 5 INJECTION, SOLUTION INTRAVENOUS at 01:03

## 2018-03-08 RX ADMIN — LABETALOL HYDROCHLORIDE 10 MG: 5 INJECTION, SOLUTION INTRAVENOUS at 10:03

## 2018-03-08 RX ADMIN — VANCOMYCIN HYDROCHLORIDE 750 MG: 750 INJECTION, POWDER, LYOPHILIZED, FOR SOLUTION INTRAVENOUS at 04:03

## 2018-03-08 RX ADMIN — FAMOTIDINE 20 MG: 10 INJECTION, SOLUTION INTRAVENOUS at 08:03

## 2018-03-08 RX ADMIN — PIPERACILLIN AND TAZOBACTAM 4.5 G: 4; .5 INJECTION, POWDER, LYOPHILIZED, FOR SOLUTION INTRAVENOUS; PARENTERAL at 05:03

## 2018-03-08 RX ADMIN — ACETAMINOPHEN 650 MG: 650 SOLUTION ORAL at 05:03

## 2018-03-08 NOTE — MEDICAL/APP STUDENT
[facility]  Pulmonology  Progress Note    Patient Name: Osiel Moeller  MRN: [id number]  Admission Date: 3/5/2018  Hospital Length of Stay: 3 days  Code Status: Full Code  Attending Provider: Eddie Zelaya MD  Primary Care Provider: Moises Long MD   Principal Problem: Cardiac Arrest    Subjective:     Interval History: Hypertensive overnight. No sedation in past 24 hours. Still without cough, gag, or corneal reflexes on exam this am: Left pupil fixed. Posturing also noted on exam this morning.     Objective:     Vital Signs (Most Recent):  Temp: 98.6 °F (37 °C) (03/08/18 0715)  Pulse: 79 (03/08/18 0843)  Resp: (!) 25 (03/08/18 0843)  BP: (!) 186/93 (03/08/18 0823)  SpO2: 100 % (03/08/18 0843) Vital Signs (24h Range):  Temp:  [98.1 °F (36.7 °C)-99 °F (37.2 °C)] 98.6 °F (37 °C)  Pulse:  [72-87] 79  Resp:  [23-36] 25  SpO2:  [94 %-100 %] 100 %  BP: (143-197)/(69-96) 186/93     Weight: 98.5 kg (217 lb 2.5 oz)  Body mass index is 34.01 kg/m².      Intake/Output Summary (Last 24 hours) at 03/08/18 0849  Last data filed at 03/08/18 0800   Gross per 24 hour   Intake          1436.88 ml   Output             1610 ml   Net          -173.12 ml       Physical Exam   Constitutional: She appears ill. She is intubated.   Eyes: Left pupil is not reactive.   Right eye irregular and cloudy; unable to visualize pupil     Cardiovascular: Normal rate and regular rhythm.    No murmur heard.  Pulses:       Radial pulses are 1+ on the right side, and 1+ on the left side.        Dorsalis pedis pulses are 1+ on the right side, and 2+ on the left side.   Right upper arm fistula positive for thrill and bruit.   Pulmonary/Chest: She is intubated. She has rhonchi.   Diffusely in all lung fields.    Abdominal: Soft. Bowel sounds are normal.   Rectal tube in place    Genitourinary:   Genitourinary Comments: ESRD on dialysis.    Marie in place with minimal amount of clear yellow urine   Musculoskeletal:   No  spontaneous movements; generally flaccid.   No response to noxious stimuli.    Neurological: She is unresponsive.   Non purposeful left eye movements; Left pupil 3mm fixed.  (AMANDA right pupil s/t eye irregularity scarring and cloudiness).   Absent corneal, gag and cough reflex.  Left pupil 2mm and fixed.   no response to noxious stimuli.  Decerebate posturing noted with suctioning.    Skin:   Healed scar over left knee  Skin breakdown on left calf; foam dressings intact  +2 generalized edema.        Vents:  Vent Mode: CMV (03/08/18 0843)  Ventilator Initiated: Yes (03/05/18 1721)  Set Rate: 22 bmp (03/08/18 0843)  Vt Set: 355 mL (03/08/18 0843)  Pressure Support: 0 cmH20 (03/08/18 0843)  PEEP/CPAP: 5 cmH20 (03/08/18 0843)  Oxygen Concentration (%): 21 (03/08/18 0843)  Peak Airway Pressure: 19 cmH2O (03/08/18 0843)  Plateau Pressure: 9.1 cmH20 (03/08/18 0843)  Total Ve: 9.3 mL (03/08/18 0843)  F/VT Ratio<105 (RSBI): (!) 65.79 (03/08/18 0843)    Lines/Drains/Airways     Central Venous Catheter Line                 Port A Cath Single Lumen -- days          Drain                 Hemodialysis AV Fistula Right upper arm -- days         NG/OG Tube 03/05/18 1737 16 Fr. Right mouth 2 days         Rectal Tube 03/05/18 2200 rectal tube w/ balloon (indicate number of mLs) 2 days         Urethral Catheter 03/05/18 2030 Temperature probe 2 days          Airway                 Airway - Non-Surgical 03/05/18 1713 Endotracheal Tube 2 days          Peripheral Intravenous Line                 Peripheral IV - Single Lumen 03/05/18 1713 Left Forearm 2 days         Peripheral IV - Single Lumen 03/06/18 2300 Left Other 1 day                Significant Labs:    CBC/Anemia Profile:    Recent Labs  Lab 03/06/18  1308 03/07/18  0343 03/08/18  0505   WBC 13.36* 11.34 9.38   HGB 9.9* 7.9* 8.9*   HCT 30.8* 24.5* 27.8*     234 206 183   MCV 87 87 88   RDW 19.0* 19.2* 19.1*        Chemistries:    Recent Labs  Lab 03/07/18 2026  03/07/18  2320 03/08/18  0505   * 135* 134*   K 4.7 5.2* 5.3*    100 98   CO2 20* 21* 21*   BUN 18 21* 24*   CREATININE 4.1* 4.5* 4.7*   CALCIUM 9.9 9.8 9.7   MG 2.0 2.2 2.1   PHOS 4.9* 5.6* 6.0*       Recent Labs  Lab 03/08/18  0505   INR 1.0   APTT 45.5*     Lactic Acid:   Recent Labs  Lab 03/06/18  1308   LACTATE 2.4*     POCT Glucose:   Recent Labs  Lab 03/08/18  0536 03/08/18  0622 03/08/18  0808   POCTGLUCOSE 178* 226* 185*       Troponin:   Recent Labs  Lab 03/06/18  1308   TROPONINI 0.344*     Significant Imaging:  CXR (03/05/2018)  I have reviewed all pertinent results/findings within the past 24 hours and my personal findings are:   -ETT above babak, port-a-cath to right chest, NGT in gastric lumen  -Evidence of upper lobe consolidation and atelectasis, mediastinal shift and traction elevation of right hemidiaphragm  -Right upper and middle lung consolidation concerning for aspiration or pneumonia     2D ECHO (3/06/2018)   -Moderate left atrial enlargement  -Low to mildly depressed LV systolic function (EF 50-55%)  -Impaired LV relaxation (grade 2 diastolic dysfunction)  -Pulmonary HTN (PA Systolic 46 mmHg)  -Mild Mitral regurgitation  -Increased CVP    CT HEAD without contrast (03/06/2018)  -No evidence of acute intracranial process.     EKG(03/05/18)  -NSR, prolonged Qtc     Assessment/Plan:   Osiel Moeller is a 42 y/o female who is s/p cardiac arrest. Remains intubated, sedation off this am.       NEURO  Acute Encephalopathy  Concerned for anoxic brain injury in the setting of cardiac arrest; CT head was unremarkable for acute intracranial process. Normothermia maintained, no fever spikes. Still not following commands; no gag, cough or corneal; noted posturing this am. breathing over the vent  -EEG pending.    -pupil/corneal responses unreliable s/t history of diabetic retinopathy. Would consider VEP if family wants further neuro workup.      CARDIAC  S/p Cardiac Arrest:    Etiology of  arrest unknown; no evidence suggesting ACS or systolic dysfunction as trigger; Elevated Troponin on admit but trended down, likely s/t cardiac contusion from code. Concern of PE as possible cause of arrest although unlikely s/t no evidence of RV strain on echo. Also unsure if aspiration was the inciting event or if occurred s/t event.   -Continue empiric heparin gtts  -TTM completed: family aware that prognostication possible after 72 hours.      Hypertension   Hypertensive this am on HD  -Amlodipine ordered per primary team  -PRN Labetalol for BG goal SBP <180.     HFpEF  EF 50-55% on ECHO, pulmonary HTN, and left atrial enlargement.  on admit likely reflective of left atrial enlargement and + fluid volume status s/t missed HD.   -Nephrology following: HD yesterday and today.       RESPIRATORY  Acute Respiratory Failure with Hypoxia and Hypercarbia  Oxygenating well on AC 22//P5/FIO2 21%; Spo2 100%. Neurologic status not compatible with extubation at this time. Evidence of upper lobe consolidation and atelectasis, mediastinal shift on admit chest x-ray; likely s/t aspiration.   -Continue current vent settings.  -aggressive pulmonary toileting; azithromycin/zosyn for CAP coverage.     RENAL   ESRD  Hyperkalemia persistent, Peripheral edema.  Known dialysis patient (MWF); right upper arm fistula present and patent:   -Nephrology following for dialysis     GI  Diarrhea, resolved.   -Fecal management system in use to prevent skin breakdown.     ENDOCRINE  Diabetes Mellitus Type 2  HgbA1c 6.1. BG 180s-200s overnight.   -Continue sliding scale low correction dose and Q4 HR accuchecks     HEME/IMMUNE  Leukocytosis and lactic acidosis likely s/t acute event: trended down.  -Continue broad spectrum CAP coverage with azithromycin/zosyn, vancomycin.   -aggressive pulmonary toileting.   -Chronic anemia likely s/t CKD, will monitor for now.      PROPHYLAXIS  PUD- Pepcid  DVT- Heparin gtts     Frida  Harsh  [place] TORRI-ACNP Student, Pulmonology  [facility]

## 2018-03-08 NOTE — PLAN OF CARE
Problem: Patient Care Overview  Goal: Plan of Care Review  Outcome: Ongoing (interventions implemented as appropriate)  Pt's SpO2 100% on mechanical ventilator settings AC 22 RR / 355 VT/ 5 PEEP/ 21% FiO2. No respiratory distress noted. Will continue to monitor SpO2.

## 2018-03-08 NOTE — PROGRESS NOTES
[facility] Medicine Resident HO-II Progress Note - Pulmonology    Subjective:     Vitals stable and patient off sedation x24 hours.  Nursing reports gag reflex on deep suction.  Family at bedside and requests Neurology evaluation.    Objective:   Last 24 Hour Vital Signs:  BP  Min: 143/77  Max: 197/94  Temp  Av.7 °F (37.1 °C)  Min: 98.1 °F (36.7 °C)  Max: 99 °F (37.2 °C)  Pulse  Av.8  Min: 72  Max: 87  Resp  Av.6  Min: 23  Max: 36  SpO2  Av.3 %  Min: 94 %  Max: 100 %  Weight  Av.5 kg (217 lb 2.5 oz)  Min: 98.5 kg (217 lb 2.5 oz)  Max: 98.5 kg (217 lb 2.5 oz)    Intake/Output Summary (Last 24 hours) at 18 0851  Last data filed at 18 0800   Gross per 24 hour   Intake          1436.88 ml   Output             1610 ml   Net          -173.12 ml     Physical Examination:  General: intubated, supine in bed, obese  HEENT: R eye cloudy and scarred, L pupil nonreactive, ETT in place, MMM  Neck: supple, trachea midline  Chest: R chest wall port without fluctuance or overlying erythema  CV: RRR, no murmur appreciated  Resp: mechanical breath sounds bilaterally, on /22/21%/5  Abd: soft, NT, ND, normoactive BS  Extrem: symmetric, no c/c/e  : Marie in place with minimal yellow urine, rectal tube with normal stool  Skin: WWP, healed surgical scar over L knee, mild skin breakdown over L calf  Neuro: intubated, nonpurposeful eyelid movement, pupils nonreactive, no gag reflex, no corneal reflex, no withdrawal from pain    Laboratory:  Laboratory Data:   No results for input(s): PH, PCO2, PO2, HCO3, POCSATURATED, BE in the last 24 hours.    Recent Labs  Lab 18  0505   WBC 9.38   RBC 3.16*   HGB 8.9*   HCT 27.8*      MCV 88   MCH 28.2   MCHC 32.0       Recent Labs  Lab 18  0505   *   K 5.3*   CL 98   CO2 21*   BUN 24*   CREATININE 4.7*   MG 2.1     Microbiology Data:  BCx (3/5) NGTD  UCx (3/5) NGTD    Other Results:  Radiology Data:   None new    2D Echo:   3/6/2018 - EF  50-55%, no WMA, significant diastolic dysfunction, PAP 46mmHg    Current Medications:     Infusions:   heparin (porcine) in D5W 16 Units/kg/hr (03/07/18 2024)    insulin (HUMAN R) infusion (adults) Stopped (03/07/18 0147)    propofol Stopped (03/07/18 0845)        Scheduled:   sodium chloride 0.9%   Intravenous Once    sodium chloride 0.9%   Intravenous Once    acetaminophen  650 mg Per NG tube Q6H    amLODIPine  10 mg Oral Daily    azithromycin  500 mg Intravenous Q24H    famotidine (PF)  20 mg Intravenous Daily    piperacillin-tazobactam (ZOSYN) IVPB  4.5 g Intravenous Q12H    sodium polystyrene  15 g Oral Once    vancomycin (VANCOCIN) IVPB  750 mg Intravenous Once        PRN:  sodium chloride 0.9%, sodium chloride 0.9%, dextrose 50%, dextrose 50%, glucagon (human recombinant), glucose, glucose, heparin (PORCINE), heparin (PORCINE), insulin aspart U-100, labetalol, sodium chloride 0.9%    Antibiotics and Day Number of Therapy:  Vancomycin, piperacillin-tazobactam (Day 4)    Lines and Day Number of Therapy:  PIV L  Rectal tube  OGT  Marie  ETT  R chest port    Assessment/Plan:     Osiel Moeller is a 43 y.o.female with    Acute Hypoxic and Hypercapnic Respiratory Failure   - continue ventilation per [facility] protocol  not difficult to ventilate  - SBT performed and patient breathes spontaneously, but still unable to extubate 2/2 mental status     S/p Cardiac Arrest  - TTC for total 72 hours  prognostication after completion - likely tomorrow AM  - etiology still unclear; consider arrhythmia (despite K normal on admit) vs PE vs aspiration with significant hypoxia  - continue empiric heparin  - corneal/pupillary reflexes unreliable due to diabetic retinopathy, so may require VEP if family requests further neuro workup  still with poor prognosis at this point     RUL Aspiration and Atelectasis  - CXR with mediastinal shift, likely atelectasis from airway obstruction  - continue broad spectrum  abx with vanc/zosyn     Essential Hypertension  - continue labetalol PRN to maintain SBP<180     ESRD on HD  - continue dialysis per Nephrology     DM2 with Hyperglycemia  - goal -180     Viviane Justin  [facility] Internal Medicine HO-II  [facility] Pulmonology Service

## 2018-03-08 NOTE — PROGRESS NOTES
[facility] Internal Medicine Resident HO-II Progress Note    Subjective:      Per ICU, no overnight complaints. Patient remains intubated, off sedation and off cooling protocol x24hrs. No other complaints. Family at bedside.      Objective:   Last 24 Hour Vital Signs:  BP  Min: 143/77  Max: 197/94  Temp  Av.7 °F (37.1 °C)  Min: 98.1 °F (36.7 °C)  Max: 98.8 °F (37.1 °C)  Pulse  Av.2  Min: 72  Max: 87  Resp  Av  Min: 23  Max: 36  SpO2  Av.9 %  Min: 94 %  Max: 100 %  Weight  Av.5 kg (217 lb 2.5 oz)  Min: 98.5 kg (217 lb 2.5 oz)  Max: 98.5 kg (217 lb 2.5 oz)  I/O last 3 completed shifts:  In: 2017.3 [I.V.:797.3; Other:300; NG/GT:220; IV Piggyback:700]  Out: 1670 [Urine:20; Drains:150; Other:1500]    Physical Examination:  General: s/p intubation; currently on 21% O2 with PEEP 5. Vt 350. , obese BMI 35, patient breathing over vent  Head: normocephalic, atraumatic   Eyes: left pupil 3mm, not reactive to light- though blind before event. Right eye with pterygoid/scarred process. +Dolls eye sign  Ears, Nose, Throat:  without rhinorrhea, OP obscured by ETT placement. NG in place. No gag on nursing assessment this AM.   Neck: supple, without thyromegaly  Cardiovascular: NRRR, without murmurs or rubs, without extra heart sounds, right UE fistula with good thrill  Pulmonary: no wheeze, mechanical breath sounds  Abdomen: soft, nontender, nondistended,   MSKL: without gross deformities; passive ROM achieved  Skin: without cyanosis, clubbing; mildly edematous hands/feet  Neurologic: intubated. Absent corneal reflex, absent light reflex (though blind previously), does not withdraw to pain. Absent gag.       Laboratory:  Laboratory Data Reviewed: yes  Pertinent Findings:    Recent Labs  Lab 18  1714  18  1308  18  0343  18  2320 18  0505 18  0742   WBC 17.93*  < > 13.36*  --  11.34  --   --  9.38  --    HGB 9.2*  < > 9.9*  --  7.9*  --   --  8.9*  --    HCT 29.7*  < > 30.8*  --   24.5*  --   --  27.8*  --      < > 234  234  --  206  --   --  183  --    MCV 92  < > 87  --  87  --   --  88  --    RDW 19.4*  < > 19.0*  --  19.2*  --   --  19.1*  --    *  < >  --   < > 141  < > 135* 134* 133*   K 5.2*  < >  --   < > 4.3  < > 5.2* 5.3* 5.2*     < >  --   < > 100  < > 100 98 97   CO2 17*  < >  --   < > 27  < > 21* 21* 21*   BUN 58*  < >  --   < > 44*  < > 21* 24* 25*   CREATININE 9.8*  < >  --   < > 7.1*  < > 4.5* 4.7* 4.9*   *  < >  --   < > 79  79  < > 133*  133* 159*  159* 210*  210*   PROT 8.3  --   --   --   --   --   --   --   --    ALBUMIN 3.5  --   --   --   --   --   --   --   --    BILITOT 0.5  --   --   --   --   --   --   --   --    *  --   --   --   --   --   --   --   --    ALKPHOS 140*  --   --   --   --   --   --   --   --    *  --   --   --   --   --   --   --   --    < > = values in this interval not displayed.      Microbiology Data Reviewed: yes  Pertinent Findings:  Blood culture x2; NGTD  Respiratory culture from sputum; not resulted  Urine culture pending      Other Results:  EKG (my interpretation): NSR    Radiology Data Reviewed: yes  Pertinent Findings:       Current Medications:     Infusions:   heparin (porcine) in D5W 16 Units/kg/hr (03/07/18 2024)    insulin (HUMAN R) infusion (adults) Stopped (03/07/18 0147)    propofol Stopped (03/07/18 4119)        Scheduled:   sodium chloride 0.9%   Intravenous Once    sodium chloride 0.9%   Intravenous Once    amLODIPine  10 mg Oral Daily    azithromycin  500 mg Intravenous Q24H    famotidine (PF)  20 mg Intravenous Daily    piperacillin-tazobactam (ZOSYN) IVPB  4.5 g Intravenous Q12H    sodium polystyrene  15 g Oral Once    sodium polystyrene  15 g Oral Once    vancomycin (VANCOCIN) IVPB  750 mg Intravenous Once        PRN:  sodium chloride 0.9%, sodium chloride 0.9%, dextrose 50%, dextrose 50%, glucagon (human recombinant), glucose, glucose, heparin (PORCINE), heparin  (PORCINE), insulin aspart U-100, labetalol, sodium chloride 0.9%    Antibiotics and Day Number of Therapy:  Vancomycin day 3  Zosyn day 3  Azithromycin day 2    Lines and Day Number of Therapy:  PIVx2.   Port a cath single lumen  Rectal tube  Marie- removed  NG/OG tube  ETT 3/5    Assessment:     Osiel Moeller is a 43 y.o.female with  Patient Active Problem List    Diagnosis Date Noted    Cardiac arrest     Encephalopathy     Acute respiratory failure with hypoxia and hypercapnia 03/05/2018    Pneumonia of both lungs due to infectious organism 12/22/2016    Type 2 diabetes mellitus with complication 12/21/2016    ESRD on dialysis     Volume overload 12/19/2016    Pneumonia 12/19/2016    Vision loss 11/29/2016    Palpitations 11/22/2016    Epigastric pain 11/16/2016    Chronic congestive heart failure 11/08/2016    Dyspnea 10/31/2016    HTN (hypertension) 10/31/2016    Diabetes 1.5, 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   - follow up pulm recs; appreciate assistance     #) severe sepsis likely due to aspiration pneumonia (RUL) with acute encephalopathy  - as above; trending lactates, continuing IV abx with vancomycin and zosyn. Added azithromycin with atypical coverage.   - CT head negative for acute process  - off sedation; spot EEG today; consult neurology for evaluation post suspect anoxic brain injury.      #) post cardiac arrest  - completed cooling  protocol per pulm/crit and cardiology  - echo w/ no wall motion abnormalities, low normal EF 50-55%, grade II diastolic dysfunction, pulm HTN, increased CVP, trended troponin and EKG.  - poor prognosis; ongoing family discussion     #) troponinemia  - frederick from 0.058 to 0.376; cardiology following, likely secondary to arrest  - EKG without ST changes     #) CKDV on HD  - MWF dialysis, nephrology consulted for HD and following; graft with good thrill on exam     #) Heart failure with preserved ejection fraciton  -  on admission, euvolemic on presentation  - will monitor; repeated echocardiogram as above.     #) DM type II  - covering with sliding scale for now     #) HTN  - restarted labetalol PRN for SBP>180. Started amlodipine per nephrology     #) elevated transaminates  - likely elevated s/p cardiac arrest.      #) normocytic anemia  - h/h 9.2/29.7 on admission; monitoring  - trended down to 7.9/24.5 this am.      Diet: npo  Code: full - ongoing family discussion with SonChip in terms of code status.   DVTppx: on heparin gtt for anticoagulation     Dispo: continued ICU care. Critically ill. Poor prognosis       Armani Vines  [facility] Internal Medicine HO-II  [facility] Internal Medicine Service Team B     [facility] Medicine Hospitalist Pager numbers:   [facility] Hospitalist Medicine Team A (Sarah/Venkat): [phone]  [facility] Hospitalist Medicine Team B (Genesis/Nathalie):  [phone]

## 2018-03-08 NOTE — PROCEDURES
DATE OF SERVICE:  03/08/2018    LOCATION:  [facility].    DURATION:  26 minutes and 8 seconds.    ELECTROENCEPHALOGRAM REPORT    METHODOLOGY:  Electroencephalographic (EEG) recording is recorded with   electrodes placed according to the International 10-20 placement system.  Thirty   two (32) channels of digital signal (sampling rate of 512/sec), including T1   and T2, were simultaneously recorded from the scalp and may include EKG, EMG,   and/or eye monitors.  Recording band pass was 0.1 to 512 Hz.  Digital video   recording of the patient is simultaneously recorded with the EEG.  The patient   is instructed to report clinical symptoms which may occur during the recording   session.  EEG and video recording are stored and archived in digital format.    Activation procedures, which include photic stimulation, hyperventilation and   instructing patients to perform simple tasks, are done in selected patients.    The EEG is displayed on a monitor screen and can be reviewed using different   montages.  Computer assisted-analysis is employed to detect spike and   electrographic seizure activity.   The entire record is submitted for computer   analysis.  The entire recording is visually reviewed, and the times identified   by computer analysis as being spikes or seizures are reviewed again.    Compressed spectral analysis (CSA) is also performed on the activity recorded   from each individual channel.  This is displayed as a power display of   frequencies from 0 to 30 Hz over time.   The CSA is reviewed looking for   asymmetries in power between homologous areas of the scalp, then compared with   the original EEG recording.    Easy-Point software was also utilized in the review of this study.  This software   suite analyzes the EEG recording in multiple domains.  Coherence and rhythmicity   are computed to identify EEG sections which may contain organized seizures.    Each channel undergoes analysis to detect the  presence of spike and sharp waves   which have special and morphological characteristics of epileptic activity.  The   routine EEG recording is converted from special into frequency domain.  This is   then displayed comparing homologous areas to identify areas of significant   asymmetry.  Algorithm to identify non-cortically generated artifact is used to   separate artifact from the EEG.     EEG FINDINGS:  The background of this study is predominantly suppressed low   voltage activity with abundant EMG artifact seen in all channels initially.    Underneath the artifact, a low amplitude delta background with some superimposed   beta frequencies in the 20 to 25 Hz range can be seen.  There is little   variability in this record throughout.  There are no normal waking or sleeping   waveforms.  The record is symmetrical.  There are no epileptiform discharges.    There are no seizures.  There are no clinical events.    INTERPRETATION:  Abnormal EEG due to moderate diffuse slowing and suppression of   the background with abundant EMG artifact overlaid in this routine study.  The   patient was intubated on video and results suggest moderate-to-severe   encephalopathy.  There were no signs of focal cerebral dysfunction or seizures   seen.      NBB/IN  dd: 03/08/2018 15:31:19 (CST)  td: 03/08/2018 16:02:34 (CST)  Doc ID   #[id number]  Job ID #[id number]    CC:

## 2018-03-08 NOTE — PROGRESS NOTES
"Vancomycin Dosing and Monitoring Pharmacy Protocol    Osiel Moeller is a 43 y.o. female    Height: 5' 7" (1.702 m)   Wt Readings from Last 1 Encounters:   03/08/18 98.5 kg (217 lb 2.5 oz)     Ideal body weight: 61.6 kg (135 lb 12.9 oz)  Adjusted ideal body weight: 76.4 kg (168 lb 5.5 oz)    Temp Readings from Last 3 Encounters:   03/08/18 98.8 °F (37.1 °C) (Core Rectal)   01/11/18 98.4 °F (36.9 °C) (Oral)   03/15/17 98 °F (36.7 °C) (Oral)      Lab Results   Component Value Date/Time    WBC 9.38 03/08/2018 05:05 AM    WBC 11.34 03/07/2018 03:43 AM    WBC 13.36 (H) 03/06/2018 01:08 PM      Lab Results   Component Value Date/Time    CREATININE 4.7 (H) 03/08/2018 05:05 AM    CREATININE 4.5 (H) 03/07/2018 11:20 PM    CREATININE 4.1 (H) 03/07/2018 08:26 PM        Serum creatinine: 4.7 mg/dL (H) 03/08/18 0505  Estimated creatinine clearance: 18.2 mL/min (A)    Antibiotics       Start     Stop Route Frequency Ordered    03/08/18 1700  vancomycin (VANCOCIN) 750 mg in sodium chloride 0.9% 250 mL IVPB      -- IV Once 03/08/18 0804    03/06/18 1515  azithromycin 500 mg in dextrose 5 % 250 mL IVPB (ready to mix system)      03/11 1514 IV Every 24 hours (non-standard times) 03/06/18 1406    03/06/18 0600  piperacillin-tazobactam 4.5 g in dextrose 5 % 100 mL IVPB (ready to mix system)      -- IV Every 12 hours (non-standard times) 03/05/18 2108          Antifungals       None            Microbiology Results (last 7 days)       Procedure Component Value Units Date/Time    Blood Culture #2 **CANNOT BE ORDERED STAT** [[id number]] Collected:  03/05/18 1810    Order Status:  Completed Specimen:  Blood from Peripheral, Hand, Left Updated:  03/08/18 0612     Blood Culture, Routine No Growth to date     Blood Culture, Routine No Growth to date     Blood Culture, Routine No Growth to date    Blood Culture #1 **CANNOT BE ORDERED STAT** [[id number]] Collected:  03/05/18 1750    Order Status:  Completed Specimen:  Blood from " Peripheral, Forearm, Left Updated:  18 0612     Blood Culture, Routine No Growth to date     Blood Culture, Routine No Growth to date     Blood Culture, Routine No Growth to date    Urine culture [[id number]] Collected:  18 2221    Order Status:  Completed Specimen:  Urine from Urine, Catheterized Updated:  18 1107     Urine Culture, Routine No growth    Culture, Respiratory with Gram Stain [[id number]]     Order Status:  No result Specimen:  Respiratory from Sputum     Blood culture [[id number]]     Order Status:  Canceled Specimen:  Blood     Blood culture [[id number]]     Order Status:  Canceled Specimen:  Blood             Indication:   Sepsis    Target Level: 15-20 mcg/ml    Hemodialysis:   Yes on unknown    Dosing Weight:   ABW--actual body weight  If ABW is greater than or equal to 30% over Ideal Body Weight, AdjBW will be used to calculate vancomycin dose.    Last Vancomycin dose: 1000 mg   Date/Time given: 3/8 122          Vancomycin level:  No results for input(s): VANCOMYCIN-TROUGH in the last 72 hours.  Recent Labs   Lab Result Units  18   0343   Vancomycin, Random ug/mL  12.4       Per Protocol Initial/Adjustments Dosin. Initial/Adjustment Dose: HEMODIALYSIS DOSE: Give maintenance dose of 750mg after next dialysis session  2. Vancomycin Random Level will be drawn prior to next HD session on 3/9 0400date/time    Pharmacy will continue to follow.    Please contact if you have any further questions. Thank you.    Art Carl, PharmD  [phone]

## 2018-03-08 NOTE — PROGRESS NOTES
Progress Note  Nephrology      Consult Requested By: Eddie Zelaya MD  Reason for Consult: ESRD    SUBJECTIVE:     Review of Systems   Unable to perform ROS: Critical illness     Patient Active Problem List   Diagnosis    Mechanical complication of other vascular device, implant, and graft    Dyspnea    HTN (hypertension)    Diabetes 1.5, managed as type 1    ESRD (end stage renal disease)    Aortic sclerosis    Chronic congestive heart failure    Epigastric pain    Palpitations    Vision loss    Volume overload    Pneumonia    ESRD on dialysis    Type 2 diabetes mellitus with complication    Pneumonia of both lungs due to infectious organism    Acute respiratory failure with hypoxia and hypercapnia    Cardiac arrest    Encephalopathy       OBJECTIVE:     Medications:   sodium chloride 0.9%   Intravenous Once    amLODIPine  10 mg Oral Daily    azithromycin  500 mg Intravenous Q24H    famotidine (PF)  20 mg Intravenous Daily    piperacillin-tazobactam (ZOSYN) IVPB  4.5 g Intravenous Q12H    sodium polystyrene  15 g Oral Once    vancomycin (VANCOCIN) IVPB  750 mg Intravenous Once      heparin (porcine) in D5W 16 Units/kg/hr (03/07/18 2024)    propofol Stopped (03/07/18 0845)     Vitals:    03/08/18 1246   BP:    Pulse: 78   Resp: (!) 27   Temp:      I/O last 3 completed shifts:  In: 2017.3 [I.V.:797.3; Other:300; NG/GT:220; IV Piggyback:700]  Out: 1670 [Urine:20; Drains:150; Other:1500]  Physical Exam   Constitutional: No distress. She is intubated.   Morbidly obese   HENT:   Head: Normocephalic and atraumatic.   Mouth/Throat: Oropharynx is clear and moist.   Eyes: EOM are normal. No scleral icterus.   Cardiovascular: Normal rate and regular rhythm.  Exam reveals no friction rub.    No murmur heard.  Pulmonary/Chest: Effort normal and breath sounds normal. She is intubated. She has no rales.   Abdominal: Soft. Bowel sounds are normal. She exhibits no distension. There is no tenderness.    Genitourinary:   Genitourinary Comments: Marie+   Musculoskeletal: Normal range of motion. She exhibits no edema.   Lymphadenopathy:     She has no cervical adenopathy.   Neurological: GCS eye subscore is 1. GCS verbal subscore is 1. GCS motor subscore is 1.   Skin: Skin is warm and dry. No erythema.     Laboratory:    Recent Labs  Lab 03/06/18  1308 03/07/18  0343 03/08/18  0505   WBC 13.36* 11.34 9.38   HGB 9.9* 7.9* 8.9*   HCT 30.8* 24.5* 27.8*     234 206 183   MONO 2.7*  0.4 5.6  0.6 8.8  0.8       Recent Labs  Lab 03/08/18  0505 03/08/18  0742 03/08/18  1147   * 133* 133*   K 5.3* 5.2* 5.3*   CL 98 97 98   CO2 21* 21* 22*   BUN 24* 25* 28*   CREATININE 4.7* 4.9* 5.2*   CALCIUM 9.7 9.7 9.4   PHOS 6.0* 6.3* 6.6*     Labs reviewed  Diagnostic Results:  X-Ray: Reviewed  US: Reviewed  Echo: Reviewed      ASSESSMENT/PLAN:   1. ESRD (N18.6 Z99.2) - usual HD on MWF with Dr. Collado in [facility]   HD done yesterday but still with hyperkalemia and acidosis  2 additional hour HD today,      2. HTN (I10) - at home on Procardia 90, started NOrvasc 10 per NG tube and Labetalol PRN SBP>160  Goal UF today 2L  3. Anemia of chronic kidney disease treated with CHING (N18.9 D63.1) - No epogen in the settings of uncontrolled HTN and ACS  4. MBD -   Lab Results   Component Value Date    CALCIUM 9.4 03/08/2018    PHOS 6.6 (H) 03/08/2018       Recent Labs  Lab 03/08/18  0505 03/08/18  0742 03/08/18  1147   MG 2.1 2.2 2.2       No results found for: [id number]  Lab Results   Component Value Date    CO2 22 (L) 03/08/2018       Thank you for allowing me to participate in the care of your patients  With any question please call [phone]  Felisa Puga    [facility]  SHAI Collado MD, SHANE LEVY MD,   MD SCHUYLER Yarbrough, MEL  [address]  [place], [address]  [phone]

## 2018-03-08 NOTE — PLAN OF CARE
Problem: Hemodialysis (Adult)  Goal: Signs and Symptoms of Listed Potential Problems Will be Absent, Minimized or Managed (Hemodialysis)  Signs and symptoms of listed potential problems will be absent, minimized or managed by discharge/transition of care (reference Hemodialysis (Adult) CPG).   Outcome: Ongoing (interventions implemented as appropriate)   03/08/18 1423   Hemodialysis   Problems Assessed (Hemodialysis) all   Problems Present (Hemodialysis) electrolyte imbalance;fluid imbalance   HD with UF

## 2018-03-08 NOTE — PLAN OF CARE
Pt BP continues to trend up now 191/92, HR 77, SpO2 % on the vent, few spont hand movements noted, +cough/+gag. MD notified about pt BP. Orders to administer 5mg IV Labetalol at this time.

## 2018-03-08 NOTE — PLAN OF CARE
Pt glucose 226, MD notified-orders to hold off on covering with sliding scale at this time, check glucose in one hour, MD will speak to primary upper level.

## 2018-03-09 LAB
ALBUMIN SERPL BCP-MCNC: 2.7 G/DL
ALBUMIN SERPL BCP-MCNC: 2.7 G/DL
ALP SERPL-CCNC: 102 U/L
ALT SERPL W/O P-5'-P-CCNC: 39 U/L
ANION GAP SERPL CALC-SCNC: 15 MMOL/L
ANION GAP SERPL CALC-SCNC: 15 MMOL/L
ANISOCYTOSIS BLD QL SMEAR: SLIGHT
APTT BLDCRRT: 46.5 SEC
AST SERPL-CCNC: 38 U/L
BASOPHILS # BLD AUTO: ABNORMAL K/UL
BASOPHILS NFR BLD: 0 %
BILIRUB SERPL-MCNC: 1.1 MG/DL
BUN SERPL-MCNC: 24 MG/DL
BUN SERPL-MCNC: 24 MG/DL
CALCIUM SERPL-MCNC: 9.4 MG/DL
CALCIUM SERPL-MCNC: 9.4 MG/DL
CHLORIDE SERPL-SCNC: 96 MMOL/L
CHLORIDE SERPL-SCNC: 96 MMOL/L
CO2 SERPL-SCNC: 24 MMOL/L
CO2 SERPL-SCNC: 24 MMOL/L
CREAT SERPL-MCNC: 4.4 MG/DL
CREAT SERPL-MCNC: 4.4 MG/DL
DACRYOCYTES BLD QL SMEAR: ABNORMAL
DIFFERENTIAL METHOD: ABNORMAL
EOSINOPHIL # BLD AUTO: ABNORMAL K/UL
EOSINOPHIL NFR BLD: 8 %
ERYTHROCYTE [DISTWIDTH] IN BLOOD BY AUTOMATED COUNT: 18.8 %
EST. GFR  (AFRICAN AMERICAN): 13 ML/MIN/1.73 M^2
EST. GFR  (AFRICAN AMERICAN): 13 ML/MIN/1.73 M^2
EST. GFR  (NON AFRICAN AMERICAN): 11 ML/MIN/1.73 M^2
EST. GFR  (NON AFRICAN AMERICAN): 11 ML/MIN/1.73 M^2
FACT X PPP CHRO-ACNC: 0.33 IU/ML
GLUCOSE SERPL-MCNC: 185 MG/DL
GLUCOSE SERPL-MCNC: 185 MG/DL
HCT VFR BLD AUTO: 26 %
HEP. B SURF AB, QUAL: NEGATIVE
HEP. B SURF AB, QUANT.: 8 MIU/ML
HGB BLD-MCNC: 8.5 G/DL
HYPOCHROMIA BLD QL SMEAR: ABNORMAL
INR PPP: 1
LYMPHOCYTES # BLD AUTO: ABNORMAL K/UL
LYMPHOCYTES NFR BLD: 12 %
MAGNESIUM SERPL-MCNC: 1.9 MG/DL
MCH RBC QN AUTO: 28.4 PG
MCHC RBC AUTO-ENTMCNC: 32.7 G/DL
MCV RBC AUTO: 87 FL
MONOCYTES # BLD AUTO: ABNORMAL K/UL
MONOCYTES NFR BLD: 6 %
NEUTROPHILS NFR BLD: 73 %
NEUTS BAND NFR BLD MANUAL: 1 %
NSE SERPL-MCNC: 112 NG/ML
OVALOCYTES BLD QL SMEAR: ABNORMAL
PHOSPHATE SERPL-MCNC: 6 MG/DL
PLATELET # BLD AUTO: 180 K/UL
PLATELET BLD QL SMEAR: ABNORMAL
PMV BLD AUTO: 10.4 FL
POCT GLUCOSE: 131 MG/DL (ref 70–110)
POCT GLUCOSE: 151 MG/DL (ref 70–110)
POCT GLUCOSE: 201 MG/DL (ref 70–110)
POIKILOCYTOSIS BLD QL SMEAR: SLIGHT
POTASSIUM SERPL-SCNC: 4.3 MMOL/L
POTASSIUM SERPL-SCNC: 4.3 MMOL/L
PROT SERPL-MCNC: 7.6 G/DL
PROTHROMBIN TIME: 10.6 SEC
RBC # BLD AUTO: 2.99 M/UL
SCHISTOCYTES BLD QL SMEAR: PRESENT
SODIUM SERPL-SCNC: 135 MMOL/L
SODIUM SERPL-SCNC: 135 MMOL/L
VANCOMYCIN SERPL-MCNC: 29.3 UG/ML
WBC # BLD AUTO: 7.69 K/UL

## 2018-03-09 PROCEDURE — 63600175 PHARM REV CODE 636 W HCPCS: Performed by: STUDENT IN AN ORGANIZED HEALTH CARE EDUCATION/TRAINING PROGRAM

## 2018-03-09 PROCEDURE — 85520 HEPARIN ASSAY: CPT

## 2018-03-09 PROCEDURE — 80202 ASSAY OF VANCOMYCIN: CPT

## 2018-03-09 PROCEDURE — 97802 MEDICAL NUTRITION INDIV IN: CPT

## 2018-03-09 PROCEDURE — 85027 COMPLETE CBC AUTOMATED: CPT

## 2018-03-09 PROCEDURE — 85610 PROTHROMBIN TIME: CPT

## 2018-03-09 PROCEDURE — 94003 VENT MGMT INPAT SUBQ DAY: CPT

## 2018-03-09 PROCEDURE — 25000003 PHARM REV CODE 250: Performed by: INTERNAL MEDICINE

## 2018-03-09 PROCEDURE — 85007 BL SMEAR W/DIFF WBC COUNT: CPT

## 2018-03-09 PROCEDURE — 27000221 HC OXYGEN, UP TO 24 HOURS

## 2018-03-09 PROCEDURE — 80053 COMPREHEN METABOLIC PANEL: CPT

## 2018-03-09 PROCEDURE — S0028 INJECTION, FAMOTIDINE, 20 MG: HCPCS | Performed by: INTERNAL MEDICINE

## 2018-03-09 PROCEDURE — 83735 ASSAY OF MAGNESIUM: CPT

## 2018-03-09 PROCEDURE — 36415 COLL VENOUS BLD VENIPUNCTURE: CPT

## 2018-03-09 PROCEDURE — 25000003 PHARM REV CODE 250: Performed by: STUDENT IN AN ORGANIZED HEALTH CARE EDUCATION/TRAINING PROGRAM

## 2018-03-09 PROCEDURE — 85730 THROMBOPLASTIN TIME PARTIAL: CPT

## 2018-03-09 PROCEDURE — 20000000 HC ICU ROOM

## 2018-03-09 PROCEDURE — 80069 RENAL FUNCTION PANEL: CPT

## 2018-03-09 PROCEDURE — 94761 N-INVAS EAR/PLS OXIMETRY MLT: CPT

## 2018-03-09 RX ORDER — LEVOTHYROXINE SODIUM ANHYDROUS 100 UG/5ML
INJECTION, POWDER, LYOPHILIZED, FOR SOLUTION INTRAVENOUS
Status: DISPENSED
Start: 2018-03-09 | End: 2018-03-10

## 2018-03-09 RX ORDER — HEPARIN SODIUM 10000 [USP'U]/100ML
INJECTION, SOLUTION INTRAVENOUS
Status: DISPENSED
Start: 2018-03-09 | End: 2018-03-09

## 2018-03-09 RX ORDER — CEFAZOLIN SODIUM 1 G/3ML
INJECTION, POWDER, FOR SOLUTION INTRAMUSCULAR; INTRAVENOUS
Status: DISCONTINUED
Start: 2018-03-09 | End: 2018-03-09 | Stop reason: WASHOUT

## 2018-03-09 RX ORDER — ALBUMIN HUMAN 250 G/1000ML
SOLUTION INTRAVENOUS
Status: DISPENSED
Start: 2018-03-09 | End: 2018-03-10

## 2018-03-09 RX ADMIN — PIPERACILLIN AND TAZOBACTAM 4.5 G: 4; .5 INJECTION, POWDER, LYOPHILIZED, FOR SOLUTION INTRAVENOUS; PARENTERAL at 05:03

## 2018-03-09 RX ADMIN — LABETALOL HYDROCHLORIDE 10 MG: 5 INJECTION, SOLUTION INTRAVENOUS at 02:03

## 2018-03-09 RX ADMIN — FAMOTIDINE 20 MG: 10 INJECTION, SOLUTION INTRAVENOUS at 10:03

## 2018-03-09 RX ADMIN — INSULIN ASPART 2 UNITS: 100 INJECTION, SOLUTION INTRAVENOUS; SUBCUTANEOUS at 06:03

## 2018-03-09 RX ADMIN — HEPARIN SODIUM AND DEXTROSE 16 UNITS/KG/HR: 10000; 5 INJECTION INTRAVENOUS at 11:03

## 2018-03-09 RX ADMIN — AMLODIPINE BESYLATE 10 MG: 5 TABLET ORAL at 10:03

## 2018-03-09 RX ADMIN — LABETALOL HYDROCHLORIDE 10 MG: 5 INJECTION, SOLUTION INTRAVENOUS at 06:03

## 2018-03-09 RX ADMIN — AZITHROMYCIN MONOHYDRATE 500 MG: 500 INJECTION, POWDER, LYOPHILIZED, FOR SOLUTION INTRAVENOUS at 04:03

## 2018-03-09 NOTE — PROGRESS NOTES
[facility] Resident HO-II Progress Note - Pulmonology    Subjective:     NAEON.  Patient continues to be nonresponsive off sedation.  HD with UF yesterday x2 hours.    Objective:   Last 24 Hour Vital Signs:  BP  Min: 144/73  Max: 189/91  Temp  Av.4 °F (36.9 °C)  Min: 98 °F (36.7 °C)  Max: 99 °F (37.2 °C)  Pulse  Av.7  Min: 72  Max: 95  Resp  Av.6  Min: 19  Max: 38  SpO2  Av.5 %  Min: 98 %  Max: 100 %  Weight  Av.4 kg (208 lb 1.8 oz)  Min: 94.4 kg (208 lb 1.8 oz)  Max: 94.4 kg (208 lb 1.8 oz)    Intake/Output Summary (Last 24 hours) at 18 0538  Last data filed at 18 1800   Gross per 24 hour   Intake          1203.75 ml   Output             2675 ml   Net         -1471.25 ml     Physical Examination:  General: intubated, supine in bed, obese  HEENT: R eye cloudy and scarred, L pupil nonreactive, ETT in place, MMM  Neck: supple, trachea midline  Chest: R chest wall port without fluctuance or overlying erythema  CV: RRR, no murmur appreciated  Resp: mechanical breath sounds bilaterally, on /22/21%/5  Abd: soft, NT, ND, normoactive BS  Extrem: symmetric, no c/c/e  : Marie in place with minimal yellow urine, rectal tube with normal stool  Skin: WWP, healed surgical scar over L knee, mild skin breakdown over L calf  Neuro: intubated, nonpurposeful eyelid movement, pupils nonreactive, no gag reflex, no corneal reflex, no withdrawal from pain, initiates breaths spontaneously with low tidal volumes    Laboratory:  Laboratory Data:   No results for input(s): PH, PCO2, PO2, HCO3, POCSATURATED, BE in the last 24 hours.    Recent Labs  Lab 18   WBC 7.69   RBC 2.99*   HGB 8.5*   HCT 26.0*      MCV 87   MCH 28.4   MCHC 32.7       Recent Labs  Lab 18   *  135*   K 4.3  4.3   CL 96  96   CO2 24  24   BUN 24*  24*   CREATININE 4.4*  4.4*   MG 1.9     Neuron specific enolase (3) 55    Microbiology Data:  BCx (3/5) NGTD  UCx (3/5) NGTD    Other  Results:  Radiology Data:   None new    2D Echo:   3/6/2018 - EF 50-55%, no WMA, significant diastolic dysfunction, PAP 46mmHg    Current Medications:     Infusions:   heparin (porcine) in D5W 16 Units/kg/hr (03/08/18 1420)    propofol Stopped (03/07/18 0845)        Scheduled:   sodium chloride 0.9%   Intravenous Once    amLODIPine  10 mg Oral Daily    azithromycin  500 mg Intravenous Q24H    famotidine (PF)  20 mg Intravenous Daily    piperacillin-tazobactam (ZOSYN) IVPB  4.5 g Intravenous Q12H    sodium polystyrene  15 g Oral Once        PRN:  sodium chloride 0.9%, sodium chloride 0.9%, dextrose 50%, dextrose 50%, glucagon (human recombinant), glucose, glucose, heparin (PORCINE), heparin (PORCINE), insulin aspart U-100, labetalol, sodium chloride 0.9%    Antibiotics and Day Number of Therapy:  Vancomycin, piperacillin-tazobactam (Day 5)    Lines and Day Number of Therapy:  PIV L  Rectal tube  OGT  Marie  ETT  R chest port    Assessment/Plan:     Osiel Moeller is a 43 y.o.female with    Acute Hypoxic and Hypercapnic Respiratory Failure   - aspiration event with RUL consolidation  - continue ventilation per [facility] protocol  SBT daily  - unable to extubate 2/2 mental status     S/p Cardiac Arrest  - TTC for total 72 hours   - etiology ? aspiration with significant hypoxia vs PE vs arrhythmia  continue empiric heparin  - Neurology consulted for assistance with prognostication  EEG pending, consider VEP if family requests further workup  - poor prognosis in light of absent reflexes and lack of motor response      Essential Hypertension  - continue norvasc + labetalol PRN to maintain SBP<180     ESRD on HD  - continue dialysis per Nephrology     DM2 with Hyperglycemia  - goal -180     Viviane Justin  [facility]-II  U Pulmonology Service

## 2018-03-09 NOTE — NURSING
Pt BG called from lab as a critical @ 460. Drawn in same arm as antibiotic was infusing mixed with D5. Double checked with accucheck- 168 per accucheck. Reported to Dr. Boudreaux. No new orders received.

## 2018-03-09 NOTE — PROGRESS NOTES
"[facility]  Adult Nutrition  Progress Note    SUMMARY       Recommendations    Recommendation/Intervention:   1. If family wishes to continue care, consider initiation of TF: Novasource Renal at 10ml/hr and advance as toelrated to goal rate of 35ml/hr to provide  1680 kcal, 76g protein, & 602ml free water. Add beneprotein bid.Free water flushes per MD.     Goals:   TF will be started within 24 hours  Nutrition Goal Status: new  Communication of RD Recs: reviewed with RN (Briana)    Reason for Assessment  Reason for Assessment: NPO/clear liquids x 5 days  Diagnosis:  (cardiac arrest)  Relevant Medical History: blind, HTN, renal failure, CAD, DM, CHF, depression, cholecystectomy, HD  General Information Comments: Pt NPO. Intubated on vent. OG tube. Receives HD.    Nutrition Risk Screen  Nutrition Risk Screen: no indicators present    Nutrition/Diet History  Food Preferences: unable to assess  Do you have any cultural, spiritual, Taoism conflicts, given your current situation?: AMANDA  Factors Affecting Nutritional Intake: NPO, on mechanical ventilation    Anthropometrics  Temp: 96.9 °F (36.1 °C)  Height Method: Estimated  Height: 5' 7" (170.2 cm)  Height (inches): 67 in  Weight Method: Bed Scale  Weight: 94.4 kg (208 lb 1.8 oz)  Weight (lb): 208.12 lb  Ideal Body Weight (IBW), Female: 135 lb  % Ideal Body Weight, Female (lb): 154.16 lb  BMI (Calculated): 32.7  BMI Grade: 30 - 34.9- obesity - grade I    Lab/Procedures/Meds  Pertinent Labs Reviewed: reviewed  Pertinent Labs Comments: Na 135L, BUN 24H, Crea 4.4H, Glu 185H, Alb 2.7L  Pertinent Medications Reviewed: reviewed  Pertinent Medications Comments: heparin    Physical Findings/Assessment  Overall Physical Appearance: on ventilator support  Tubes: orogastric tube  Oral/Mouth Cavity:  (unable to assess)  Skin:  (Rahat 9-leg wound)    Estimated/Assessed Needs  Weight Used For Calorie Calculations: 94.4 kg (208 lb 1.8 oz)  Height: 5' 7" (170.2 " cm)  Energy Calorie Requirements (kcal): 1629  Energy Need Method: [facility]  RMR ([place]-St. Jeor Equation): 1621.62  Protein Requirements: 92g (1.5g/kg)  Weight Used For Protein Calculations: 61.3 kg (135 lb 2.3 oz) (IBW)  Fluid Need Method: RDA Method  RDA Method (mL): 1629     Nutrition Prescription Ordered  Current Diet Order: NPO    Evaluation of Received Nutrient/Fluid Intake  Energy Calories Required: not meeting needs  Protein Required: not meeting needs  Fluid Required: not meeting needs  Comments: LBM 3/9  % Intake of Estimated Energy Needs: Other: NPO  % Meal Intake: NPO    Nutrition Risk  Level of Risk/Frequency of Follow-up:  (2xweekly)     Recommendations  Recommendation/Intervention: 1. If family wishes to continue care, consider initiation of TF: Novasource Renal at 10ml/hr and advance as toelrated to goal rate of 35ml/hr to provide  1680 kcal, 76g protein, & 602ml free water. Add beneprotein bid.Free water flushes per MD.   Goals: TF will be started within 24 hours  Nutrition Goal Status: new  Communication of RD Recs: reviewed with RN (Briana)    Assessment and Plan    Cardiac arrest    Contributing Nutrition Diagnosis  Inadequate energy intake    Related to (etiology):   intubation    Signs and Symptoms (as evidenced by):   NPO    Interventions/Recommendations (treatment strategy):  See recs    Nutrition Diagnosis Status:   New               Monitor and Eval  Food and Nutrient Intake: energy intake  Food and Nutrient Adminstration: enteral and parenteral nutrition administration  Physical Activity and Function: nutrition-related ADLs and IADLs  Anthropometric Measurements: weight  Biochemical Data, Medical Tests and Procedures: electrolyte and renal panel  Nutrition-Focused Physical Findings: overall appearance     Nutrition Follow-Up  RD Follow-up?: Yes

## 2018-03-09 NOTE — PLAN OF CARE
Problem: Patient Care Overview  Goal: Plan of Care Review  Outcome: Ongoing (interventions implemented as appropriate)  Recommendation/Intervention:   1. If family wishes to continue care, consider initiation of TF: Novasource Renal at 10ml/hr and advance as toelrated to goal rate of 35ml/hr to provide  1680 kcal, 76g protein, & 602ml free water. Add beneprotein bid.Free water flushes per MD.     Goals:   TF will be started within 24 hours  Nutrition Goal Status: new  Communication of RD Recs: reviewed with RN (Briana)

## 2018-03-09 NOTE — PLAN OF CARE
TN spoke to Dr. Bliss on team to inquire if goals of care have been discussed with family. Md informed Tn pt's sons want to discuss with other family memebers before making decisions. TN requested Palliative Care to be consulted if team agrees.     03/09/18 1424   Discharge Reassessment   Assessment Type Discharge Planning Reassessment   Provided patient/caregiver education on the expected discharge date and the discharge plan Yes   Do you have any problems affording any of your prescribed medications? TBD   Discharge Plan A (TBD)

## 2018-03-09 NOTE — CONSULTS
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         ROS: unable to obtain    Histories:     Allergies:  Tramadol    Current Medications:    Current Facility-Administered Medications   Medication Dose Route Frequency Provider Last Rate Last Dose    0.9%  NaCl infusion   Intravenous PRN Felisa Puga MD        0.9%  NaCl infusion   Intravenous PRN Felisa Puga MD        0.9%  NaCl infusion   Intravenous Once Felisa Puga MD        amLODIPine tablet 10 mg  10 mg Oral Daily Felisa Puga MD   10 mg at 03/08/18 0823    azithromycin 500 mg in dextrose 5 % 250 mL IVPB (ready to mix system)  500 mg Intravenous Q24H Kapil Kelly  mL/hr at 03/08/18 1626 500 mg at 03/08/18 1626    dextrose 50% injection 12.5 g  12.5 g Intravenous PRN Armani Vines MD        dextrose 50% injection 25 g  25 g Intravenous PRN Armani Vines MD        famotidine (PF) injection 20 mg  20 mg Intravenous Daily Eddie Zelaya MD   20 mg at 03/08/18 0823    glucagon (human recombinant) injection 1 mg  1 mg Intramuscular PRN Armani Vines MD        glucose chewable tablet 16 g  16 g Oral PRN Armani Vines MD        glucose chewable tablet 24 g  24 g Oral PRN Armani Vines MD        heparin 25,000 units in dextrose 5% 250 mL (100 units/mL) bolus from bag; PRN BOLUS  70 Units/kg Intravenous PRN Armani Vines MD        heparin " 25,000 units in dextrose 5% 250 mL (100 units/mL) bolus from bag; PRN BOLUS  35 Units/kg Intravenous PRN Armani Vines MD        heparin 25,000 units in dextrose 5% 250 mL (100 units/mL) infusion; FEMALE  16 Units/kg/hr Intravenous Continuous Armani Vines MD 16.3 mL/hr at 18 1420 16 Units/kg/hr at 18 1420    insulin aspart U-100 pen 0-5 Units  0-5 Units Subcutaneous Q6H PRN Armani Vines MD   2 Units at 18 0610    labetalol injection 10 mg  10 mg Intravenous Q4H PRN Felisa Puga MD   10 mg at 18 0610    piperacillin-tazobactam 4.5 g in dextrose 5 % 100 mL IVPB (ready to mix system)  4.5 g Intravenous Q12H Armani Vines MD 25 mL/hr at 18 0502 4.5 g at 18 0502    propofol (DIPRIVAN) 10 mg/mL infusion  20 mcg/kg/min Intravenous Continuous Armani Vines MD   Stopped at 18 0845    sodium chloride 0.9% flush 5 mL  5 mL Intravenous PRN Armani Vines MD        sodium polystyrene 15 gram/60 mL suspension 15 g  15 g Oral Once Armani Vines MD           Past Medical/Surgical/Family History:  Medical:   Past Medical History:   Diagnosis Date    Blind     CHF (congestive heart failure)     Coronary artery disease     Depression     Diabetes mellitus     Heart murmur     Hypertension     Positive D dimer     Renal failure, chronic       Surgeries:   Past Surgical History:   Procedure Laterality Date     SECTION      x3    CHOLECYSTECTOMY  2009    DIALYSIS FISTULA CREATION Right     upper arm    RETINAL DETACHMENT SURGERY      TUBAL LIGATION      VASCULAR SURGERY Bilateral     vein surgery      Family:   Family History   Problem Relation Age of Onset    Heart disease Mother     Hyperlipidemia Mother     Hypertension Mother     Heart disease Father     Hypertension Father    , no family history of nerve or muscle disease    Social History:    Substance Abuse/Dependence  History:  NA    Occupational/Employment History:  NA      Current Evaluation:     Vital Signs:   Vitals:    03/09/18 0915   BP:    Pulse: 67   Resp: (!) 22   Temp:       Neurological Exam  [id number]   Intubated, off sedation for >48 h, breathing over the vent at times  Right pupil is fixed and midsize; hx of diabetic retinopathy/blindness  Corneal reflex is absent  Dolls is weak if not absent  Gag to deep suction only/ no cough  RUE some extension to noxious stimulation at the shoulder/nailbed  LUE some flexion to noxious stimulation at the shoulder/nailbed  BLLE extension to thigh pinch/nailbed pressure    LABORATORY STUDIES:  Recent Results (from the past 24 hour(s))   Basic metabolic panel    Collection Time: 03/08/18 11:47 AM   Result Value Ref Range    Sodium 133 (L) 136 - 145 mmol/L    Potassium 5.3 (H) 3.5 - 5.1 mmol/L    Chloride 98 95 - 110 mmol/L    CO2 22 (L) 23 - 29 mmol/L    Glucose 192 (H) 70 - 110 mg/dL    BUN, Bld 28 (H) 6 - 20 mg/dL    Creatinine 5.2 (H) 0.5 - 1.4 mg/dL    Calcium 9.4 8.7 - 10.5 mg/dL    Anion Gap 13 8 - 16 mmol/L    eGFR if African American 11 (A) >60 mL/min/1.73 m^2    eGFR if non African American 9 (A) >60 mL/min/1.73 m^2   Glucose, random (Unless on insulin infusion)    Collection Time: 03/08/18 11:47 AM   Result Value Ref Range    Glucose 192 (H) 70 - 110 mg/dL   Magnesium    Collection Time: 03/08/18 11:47 AM   Result Value Ref Range    Magnesium 2.2 1.6 - 2.6 mg/dL   Phosphorus    Collection Time: 03/08/18 11:47 AM   Result Value Ref Range    Phosphorus 6.6 (H) 2.7 - 4.5 mg/dL   POCT glucose    Collection Time: 03/08/18 11:55 AM   Result Value Ref Range    POCT Glucose 194 (H) 70 - 110 mg/dL   POCT glucose    Collection Time: 03/08/18  5:35 PM   Result Value Ref Range    POCT Glucose 187 (H) 70 - 110 mg/dL   Glucose, random (Unless on insulin infusion)    Collection Time: 03/08/18  7:20 PM   Result Value Ref Range    Glucose 460 (HH) 70 - 110 mg/dL   POCT glucose    Collection  Time: 03/08/18  7:56 PM   Result Value Ref Range    POCT Glucose 168 (H) 70 - 110 mg/dL   POCT glucose    Collection Time: 03/08/18 11:55 PM   Result Value Ref Range    POCT Glucose 180 (H) 70 - 110 mg/dL   CBC auto differential    Collection Time: 03/09/18  4:47 AM   Result Value Ref Range    WBC 7.69 3.90 - 12.70 K/uL    RBC 2.99 (L) 4.00 - 5.40 M/uL    Hemoglobin 8.5 (L) 12.0 - 16.0 g/dL    Hematocrit 26.0 (L) 37.0 - 48.5 %    MCV 87 82 - 98 fL    MCH 28.4 27.0 - 31.0 pg    MCHC 32.7 32.0 - 36.0 g/dL    RDW 18.8 (H) 11.5 - 14.5 %    Platelets 180 150 - 350 K/uL    MPV 10.4 9.2 - 12.9 fL    Lymph # CANCELED 1.0 - 4.8 K/uL    Mono # CANCELED 0.3 - 1.0 K/uL    Eos # CANCELED 0.0 - 0.5 K/uL    Baso # CANCELED 0.00 - 0.20 K/uL    Gran% 73.0 38.0 - 73.0 %    Lymph% 12.0 (L) 18.0 - 48.0 %    Mono% 6.0 4.0 - 15.0 %    Eosinophil% 8.0 0.0 - 8.0 %    Basophil% 0.0 0.0 - 1.9 %    Bands 1.0 %    Platelet Estimate Appears normal     Aniso Slight     Poik Slight     Hypo Occasional     Ovalocytes Occasional     Tear Drop Cells Occasional     Schistocytes Present     Differential Method Manual    Protime-INR    Collection Time: 03/09/18  4:47 AM   Result Value Ref Range    Prothrombin Time 10.6 9.0 - 12.5 sec    INR 1.0 0.8 - 1.2   APTT    Collection Time: 03/09/18  4:47 AM   Result Value Ref Range    aPTT 46.5 (H) 21.0 - 32.0 sec   Vancomycin, random    Collection Time: 03/09/18  4:47 AM   Result Value Ref Range    Vancomycin, Random 29.3 Not established ug/mL   Magnesium    Collection Time: 03/09/18  4:47 AM   Result Value Ref Range    Magnesium 1.9 1.6 - 2.6 mg/dL   Renal function panel    Collection Time: 03/09/18  4:47 AM   Result Value Ref Range    Glucose 185 (H) 70 - 110 mg/dL    Sodium 135 (L) 136 - 145 mmol/L    Potassium 4.3 3.5 - 5.1 mmol/L    Chloride 96 95 - 110 mmol/L    CO2 24 23 - 29 mmol/L    BUN, Bld 24 (H) 6 - 20 mg/dL    Calcium 9.4 8.7 - 10.5 mg/dL    Creatinine 4.4 (H) 0.5 - 1.4 mg/dL    Albumin 2.7 (L)  3.5 - 5.2 g/dL    Phosphorus 6.0 (H) 2.7 - 4.5 mg/dL    eGFR if African American 13 (A) >60 mL/min/1.73 m^2    eGFR if non African American 11 (A) >60 mL/min/1.73 m^2    Anion Gap 15 8 - 16 mmol/L   Comprehensive metabolic panel    Collection Time: 03/09/18  4:47 AM   Result Value Ref Range    Sodium 135 (L) 136 - 145 mmol/L    Potassium 4.3 3.5 - 5.1 mmol/L    Chloride 96 95 - 110 mmol/L    CO2 24 23 - 29 mmol/L    Glucose 185 (H) 70 - 110 mg/dL    BUN, Bld 24 (H) 6 - 20 mg/dL    Creatinine 4.4 (H) 0.5 - 1.4 mg/dL    Calcium 9.4 8.7 - 10.5 mg/dL    Total Protein 7.6 6.0 - 8.4 g/dL    Albumin 2.7 (L) 3.5 - 5.2 g/dL    Total Bilirubin 1.1 (H) 0.1 - 1.0 mg/dL    Alkaline Phosphatase 102 55 - 135 U/L    AST 38 10 - 40 U/L    ALT 39 10 - 44 U/L    Anion Gap 15 8 - 16 mmol/L    eGFR if African American 13 (A) >60 mL/min/1.73 m^2    eGFR if non African American 11 (A) >60 mL/min/1.73 m^2   POCT glucose    Collection Time: 03/09/18  6:07 AM   Result Value Ref Range    POCT Glucose 201 (H) 70 - 110 mg/dL       Thyroid NA  HgA1C%:  NA  Vit B12: NA  Folate:  NA    RADIOLOGY STUDIES:  I have personally reviewed the images performed.     HEAD CT: no acute process    BRAIN MRI NA      Assessment:  P   44 y/o woman w/ progressive chronic medical issues including ESRD/blindness admitted to ICU following unwitnessed PEA/ACLS/Hypothermia protocol. Now off sedation for ~ 48 h in minimally conscious state, with little more than brainstem reflexes intact. Patient had expressed her end of life goals with family multiple times in the past per sister as patient was realistic about her chronic medical problems. Given her hx of multiple organ failure secondary to diabetes, meaningful recovery (eating on her own, able to interact with family) is very low.    CAESAR  - MRI is available to evaluate extent of brain injury resulting from PEA and prolonged hypoxia  - VEP can be useful for prognostication, in particular with comatose patients;  however visual pathology would likely make interpreting results difficult  - Patient would benefit from palliative consult/comfort measures if family is in agreement  - PEG/Trach with LTAC if family unable to reach unanimous decision at this time    Differential diagnosis was explained to the patient. All questions were answered. Patient understood and agreed to adhere to plan.       Case discussed with Dr. Yara Damon MD      Will follow for additional input regarding management of current neurologic condition and monitor for any new signs/symptoms to suggest neurologic detrimental changes.     Appreciate the consult.

## 2018-03-09 NOTE — PROGRESS NOTES
[facility] Internal Medicine Resident HO-II Progress Note    Subjective:      Ms. Moeller was seen and examined at the bedside. No events overnight. Per discussion with sister at bedside, patient's sons are in town now. Family would like to have discussion about goals of care this morning. Neurology consulted to evaluate anoxic brain injury.      Objective:   Last 24 Hour Vital Signs:  BP  Min: 144/73  Max: 189/91  Temp  Av.4 °F (36.9 °C)  Min: 98 °F (36.7 °C)  Max: 99 °F (37.2 °C)  Pulse  Av.2  Min: 69  Max: 95  Resp  Av.1  Min: 16  Max: 38  SpO2  Av.6 %  Min: 98 %  Max: 100 %  Weight  Av.4 kg (208 lb 1.8 oz)  Min: 94.4 kg (208 lb 1.8 oz)  Max: 94.4 kg (208 lb 1.8 oz)  I/O last 3 completed shifts:  In: 1786.8 [I.V.:586.8; Other:400; IV Piggyback:800]  Out: 2725 [Urine:25; Drains:300; Other:2400]    Physical Examination:  General: s/p intubation; currently on 21% O2 with PEEP 5. Vt 350. 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 Right eye with pterygoid/scarred process. -Dolls eye sign  Ears, Nose, Throat:  without rhinorrhea, OP obscured by ETT placement. NG in place. No gag reflex this AM.    Neck: supple, without thyromegaly  Cardiovascular: NRRR, without murmurs or rubs, without extra heart sounds, right UE fistula with good thrill  Pulmonary: no wheeze, mechanical breath sounds  Abdomen: soft, nontender, nondistended,   MSKL: without gross deformities; passive ROM achieved  Skin: without cyanosis, clubbing; mildly edematous hands/feet  Neurologic: intubated. Absent corneal reflex, absent light reflex (though blind previously), does not withdraw to pain. Absent gag.       Laboratory:  Laboratory Data Reviewed: yes  Pertinent Findings:    Recent Labs  Lab 18  1714  18  0343  18  0505 18  0742 18  1147 18  1920 18  0447   WBC 17.93*  < > 11.34  --  9.38  --   --   --  7.69    HGB 9.2*  < > 7.9*  --  8.9*  --   --   --  8.5*   HCT 29.7*  < > 24.5*  --  27.8*  --   --   --  26.0*     < > 206  --  183  --   --   --  180   MCV 92  < > 87  --  88  --   --   --  87   RDW 19.4*  < > 19.2*  --  19.1*  --   --   --  18.8*   *  < > 141  < > 134* 133* 133*  --  135*  135*   K 5.2*  < > 4.3  < > 5.3* 5.2* 5.3*  --  4.3  4.3     < > 100  < > 98 97 98  --  96  96   CO2 17*  < > 27  < > 21* 21* 22*  --  24  24   BUN 58*  < > 44*  < > 24* 25* 28*  --  24*  24*   CREATININE 9.8*  < > 7.1*  < > 4.7* 4.9* 5.2*  --  4.4*  4.4*   *  < > 79  79  < > 159*  159* 210*  210* 192*  192* 460* 185*  185*   PROT 8.3  --   --   --   --   --   --   --  7.6   ALBUMIN 3.5  --   --   --   --   --   --   --  2.7*  2.7*   BILITOT 0.5  --   --   --   --   --   --   --  1.1*   *  --   --   --   --   --   --   --  38   ALKPHOS 140*  --   --   --   --   --   --   --  102   *  --   --   --   --   --   --   --  39   < > = values in this interval not displayed.      Microbiology Data Reviewed: yes  Pertinent Findings:  Blood culture x2; NGTD  Urine culture NGTD    Other Results:  EKG (my interpretation): NSR    Radiology Data Reviewed: yes  Pertinent Findings:       Current Medications:     Infusions:   heparin (porcine) in D5W 16 Units/kg/hr (03/08/18 1420)    propofol Stopped (03/07/18 0845)        Scheduled:   sodium chloride 0.9%   Intravenous Once    amLODIPine  10 mg Oral Daily    azithromycin  500 mg Intravenous Q24H    famotidine (PF)  20 mg Intravenous Daily    piperacillin-tazobactam (ZOSYN) IVPB  4.5 g Intravenous Q12H    sodium polystyrene  15 g Oral Once        PRN:  sodium chloride 0.9%, sodium chloride 0.9%, dextrose 50%, dextrose 50%, glucagon (human recombinant), glucose, glucose, heparin (PORCINE), heparin (PORCINE), insulin aspart U-100, labetalol, sodium chloride 0.9%    Antibiotics and Day Number of Therapy:  Vancomycin day 4  Zosyn day  4  Azithromycin day 3    Lines and Day Number of Therapy:  PIVx2.   Port a cath single lumen  Rectal tube  Marie- removed  NG/OG tube  ETT 3/5    Assessment:     Osiel Moeller is a 43 y.o.female with  Patient Active Problem List    Diagnosis Date Noted    Cardiac arrest     Encephalopathy     Acute respiratory failure with hypoxia and hypercapnia 03/05/2018    Pneumonia of both lungs due to infectious organism 12/22/2016    Type 2 diabetes mellitus with complication 12/21/2016    ESRD on dialysis     Volume overload 12/19/2016    Pneumonia 12/19/2016    Vision loss 11/29/2016    Palpitations 11/22/2016    Epigastric pain 11/16/2016    Chronic congestive heart failure 11/08/2016    Dyspnea 10/31/2016    HTN (hypertension) 10/31/2016    Diabetes 1.5, managed as type 1 10/31/2016    ESRD (end stage renal disease) 10/31/2016    Aortic sclerosis 10/31/2016    Mechanical complication of other vascular device, implant, and graft 07/17/2014        Plan:     #) Acute hypoxic and hypercapnic respiratory failure s/p cardiac arrest  - patient currently intubated; pulm following for vent management; trending ABGs for titration; on propofol for sedation (now off), able to wean O2 overnight, currently on 21%  - likely aspiration event w/ pneumonia based on CXR and HPI  - on hepatin gtt for empiric anticoagulation per pulm concerning for PE.   - follow up pulm recs; appreciate assistance  - EEG suggesting moderate to severe encephalopathy; no evidence of focal cerebral dysfunction or seizures  - neurology consulted to evaluate anoxic brain injury - appreciate recommendations  - per discussion with family this AM; patient's son are now in town, would like to have family meeting this AM to discuss goals of care     #) severe sepsis likely due to aspiration pneumonia (RUL) with acute encephalopathy  - as above; trending lactates, continuing IV abx with vancomycin and zosyn. Added azithromycin with  atypical coverage.   - CT head negative for acute process  - off sedation; spot EEG yesterday (results above); consult neurology for evaluation post suspect anoxic brain injury.      #) post cardiac arrest  - completed cooling protocol per pulm/crit and cardiology  - echo w/ no wall motion abnormalities, low normal EF 50-55%, grade II diastolic dysfunction, pulm HTN, increased CVP, trended troponin and EKG.  - poor prognosis; ongoing family discussion -family meeting today     #) troponinemia  - frederick from 0.058 to 0.376; cardiology following, likely secondary to arrest  - EKG without ST changes     #) CKDV on HD  - MWF dialysis, nephrology consulted for HD and following; graft with good thrill on exam     #) Heart failure with preserved ejection fraciton  -  on admission, euvolemic on presentation  - will monitor; repeated echocardiogram as above.     #) DM type II  - covering with sliding scale for now     #) HTN  - restarted labetalol PRN for SBP>180. Started amlodipine per nephrology     #) elevated transaminates  - likely elevated s/p cardiac arrest.      #) normocytic anemia  - h/h 9.2/29.7 on admission; monitoring  - trended down to 7.9/24.5 this am.      Diet: npo  Code: full - ongoing family discussion with SonChip in terms of code status.   DVTppx: on heparin gtt for anticoagulation     Dispo: continued ICU care. Critically ill. Poor prognosis. Family meeting today to discuss goals of care.        Sumeet Healy  [facility] Internal Medicine HO-II  [facility] Internal Medicine Service Team B     [facility] Medicine Hospitalist Pager numbers:   [facility] Hospitalist Medicine Team A (Sarah/Venkat): [phone]  [facility] Hospitalist Medicine Team B (Genesis/Nathalie):  [phone]

## 2018-03-09 NOTE — ASSESSMENT & PLAN NOTE
Contributing Nutrition Diagnosis  Inadequate energy intake    Related to (etiology):   intubation    Signs and Symptoms (as evidenced by):   NPO    Interventions/Recommendations (treatment strategy):  See recs    Nutrition Diagnosis Status:   New

## 2018-03-09 NOTE — CHAPLAIN
Patient is not likely to recover and family says she would not want to live this way.  Family also said that their  is coming.  They asked me for prayer but otherwise was minimally verbal.  One of the doctors visited and suggested keeping patient comfortable as the goal.  An other doctor is expected to visit with this family.

## 2018-03-09 NOTE — PLAN OF CARE
Problem: Patient Care Overview  Goal: Plan of Care Review  Outcome: Ongoing (interventions implemented as appropriate)  Patient's sons have arrived and have to decided to withdraw care. They would like family to come and pay respects first, more than likely tomorrow.

## 2018-03-09 NOTE — PLAN OF CARE
Problem: Patient Care Overview  Goal: Plan of Care Review  Pt resting in bed with sister at bedside. Sister updated on plan of care. No spontaneous movements noted from pt. Gag and cough reflex present. Some slight eye opening with cough. No withdrawal to pain stimulus. Remains on heparin gtt and is therapeutic. Anuric tonight. Continuous monitoring maintained.

## 2018-03-09 NOTE — CHAPLAIN
Son was outside of patient room crying .  I approached him and offered support presence.  Allowed him to cry and grieve.  A visiting  also stopped and offered brief prayer and compassionate support.

## 2018-03-10 PROBLEM — J69.0 ASPIRATION PNEUMONIA OF RIGHT UPPER LOBE: Status: ACTIVE | Noted: 2018-03-10

## 2018-03-10 LAB
ALBUMIN SERPL BCP-MCNC: 2.6 G/DL
ALBUMIN SERPL BCP-MCNC: 2.6 G/DL
ALP SERPL-CCNC: 103 U/L
ALT SERPL W/O P-5'-P-CCNC: 31 U/L
ANION GAP SERPL CALC-SCNC: 20 MMOL/L
ANION GAP SERPL CALC-SCNC: 20 MMOL/L
APTT BLDCRRT: 43.2 SEC
AST SERPL-CCNC: 34 U/L
BASOPHILS # BLD AUTO: 0.04 K/UL
BASOPHILS NFR BLD: 0.6 %
BILIRUB SERPL-MCNC: 1 MG/DL
BUN SERPL-MCNC: 38 MG/DL
BUN SERPL-MCNC: 38 MG/DL
CALCIUM SERPL-MCNC: 9.8 MG/DL
CALCIUM SERPL-MCNC: 9.8 MG/DL
CHLORIDE SERPL-SCNC: 94 MMOL/L
CHLORIDE SERPL-SCNC: 94 MMOL/L
CO2 SERPL-SCNC: 18 MMOL/L
CO2 SERPL-SCNC: 18 MMOL/L
CREAT SERPL-MCNC: 5.7 MG/DL
CREAT SERPL-MCNC: 5.7 MG/DL
DIFFERENTIAL METHOD: ABNORMAL
EOSINOPHIL # BLD AUTO: 0.6 K/UL
EOSINOPHIL NFR BLD: 9.7 %
ERYTHROCYTE [DISTWIDTH] IN BLOOD BY AUTOMATED COUNT: 18.6 %
EST. GFR  (AFRICAN AMERICAN): 10 ML/MIN/1.73 M^2
EST. GFR  (AFRICAN AMERICAN): 10 ML/MIN/1.73 M^2
EST. GFR  (NON AFRICAN AMERICAN): 8 ML/MIN/1.73 M^2
EST. GFR  (NON AFRICAN AMERICAN): 8 ML/MIN/1.73 M^2
FACT X PPP CHRO-ACNC: 0.42 IU/ML
GLUCOSE SERPL-MCNC: 201 MG/DL
GLUCOSE SERPL-MCNC: 201 MG/DL
HCT VFR BLD AUTO: 28.7 %
HGB BLD-MCNC: 9.4 G/DL
INR PPP: 1
LYMPHOCYTES # BLD AUTO: 0.6 K/UL
LYMPHOCYTES NFR BLD: 9.4 %
MAGNESIUM SERPL-MCNC: 2.1 MG/DL
MCH RBC QN AUTO: 28.1 PG
MCHC RBC AUTO-ENTMCNC: 32.8 G/DL
MCV RBC AUTO: 86 FL
MONOCYTES # BLD AUTO: 0.5 K/UL
MONOCYTES NFR BLD: 7.5 %
NEUTROPHILS # BLD AUTO: 4.7 K/UL
NEUTROPHILS NFR BLD: 72.8 %
PHOSPHATE SERPL-MCNC: 7.4 MG/DL
PLATELET # BLD AUTO: 221 K/UL
PMV BLD AUTO: 10.6 FL
POTASSIUM SERPL-SCNC: 4.3 MMOL/L
POTASSIUM SERPL-SCNC: 4.3 MMOL/L
PROT SERPL-MCNC: 7.9 G/DL
PROTHROMBIN TIME: 10.3 SEC
RBC # BLD AUTO: 3.35 M/UL
SODIUM SERPL-SCNC: 132 MMOL/L
SODIUM SERPL-SCNC: 132 MMOL/L
WBC # BLD AUTO: 6.5 K/UL

## 2018-03-10 PROCEDURE — 25000003 PHARM REV CODE 250: Performed by: STUDENT IN AN ORGANIZED HEALTH CARE EDUCATION/TRAINING PROGRAM

## 2018-03-10 PROCEDURE — 25000003 PHARM REV CODE 250: Performed by: INTERNAL MEDICINE

## 2018-03-10 PROCEDURE — 36415 COLL VENOUS BLD VENIPUNCTURE: CPT

## 2018-03-10 PROCEDURE — 85025 COMPLETE CBC W/AUTO DIFF WBC: CPT

## 2018-03-10 PROCEDURE — 31720 CLEARANCE OF AIRWAYS: CPT

## 2018-03-10 PROCEDURE — S0028 INJECTION, FAMOTIDINE, 20 MG: HCPCS | Performed by: INTERNAL MEDICINE

## 2018-03-10 PROCEDURE — 83735 ASSAY OF MAGNESIUM: CPT

## 2018-03-10 PROCEDURE — 94761 N-INVAS EAR/PLS OXIMETRY MLT: CPT

## 2018-03-10 PROCEDURE — 85730 THROMBOPLASTIN TIME PARTIAL: CPT

## 2018-03-10 PROCEDURE — 94003 VENT MGMT INPAT SUBQ DAY: CPT

## 2018-03-10 PROCEDURE — 27000221 HC OXYGEN, UP TO 24 HOURS

## 2018-03-10 PROCEDURE — 11000001 HC ACUTE MED/SURG PRIVATE ROOM

## 2018-03-10 PROCEDURE — 63600175 PHARM REV CODE 636 W HCPCS: Performed by: STUDENT IN AN ORGANIZED HEALTH CARE EDUCATION/TRAINING PROGRAM

## 2018-03-10 PROCEDURE — 80053 COMPREHEN METABOLIC PANEL: CPT

## 2018-03-10 PROCEDURE — 85520 HEPARIN ASSAY: CPT

## 2018-03-10 PROCEDURE — 85610 PROTHROMBIN TIME: CPT

## 2018-03-10 PROCEDURE — 25000003 PHARM REV CODE 250: Performed by: HOSPITALIST

## 2018-03-10 PROCEDURE — 80069 RENAL FUNCTION PANEL: CPT

## 2018-03-10 RX ORDER — GLYCOPYRROLATE 0.2 MG/ML
0.2 INJECTION INTRAMUSCULAR; INTRAVENOUS 3 TIMES DAILY PRN
Status: DISCONTINUED | OUTPATIENT
Start: 2018-03-10 | End: 2018-03-12 | Stop reason: HOSPADM

## 2018-03-10 RX ORDER — SCOLOPAMINE TRANSDERMAL SYSTEM 1 MG/1
1 PATCH, EXTENDED RELEASE TRANSDERMAL
Status: DISCONTINUED | OUTPATIENT
Start: 2018-03-10 | End: 2018-03-12 | Stop reason: HOSPADM

## 2018-03-10 RX ORDER — GLYCOPYRROLATE 1 MG/5ML
1 SOLUTION ORAL 3 TIMES DAILY PRN
Status: DISCONTINUED | OUTPATIENT
Start: 2018-03-10 | End: 2018-03-10

## 2018-03-10 RX ORDER — MORPHINE SULFATE 4 MG/ML
1 INJECTION, SOLUTION INTRAMUSCULAR; INTRAVENOUS EVERY 10 MIN PRN
Status: DISCONTINUED | OUTPATIENT
Start: 2018-03-10 | End: 2018-03-12 | Stop reason: HOSPADM

## 2018-03-10 RX ADMIN — AMLODIPINE BESYLATE 10 MG: 5 TABLET ORAL at 10:03

## 2018-03-10 RX ADMIN — LABETALOL HYDROCHLORIDE 10 MG: 5 INJECTION, SOLUTION INTRAVENOUS at 03:03

## 2018-03-10 RX ADMIN — SCOPALAMINE 1 PATCH: 1 PATCH, EXTENDED RELEASE TRANSDERMAL at 10:03

## 2018-03-10 RX ADMIN — GLYCOPYRROLATE 0.2 MG: 0.2 INJECTION INTRAMUSCULAR; INTRAVENOUS at 04:03

## 2018-03-10 RX ADMIN — PIPERACILLIN AND TAZOBACTAM 4.5 G: 4; .5 INJECTION, POWDER, LYOPHILIZED, FOR SOLUTION INTRAVENOUS; PARENTERAL at 06:03

## 2018-03-10 RX ADMIN — MORPHINE SULFATE 1 MG: 4 INJECTION INTRAVENOUS at 05:03

## 2018-03-10 RX ADMIN — HEPARIN SODIUM AND DEXTROSE 16 UNITS/KG/HR: 10000; 5 INJECTION INTRAVENOUS at 03:03

## 2018-03-10 RX ADMIN — LABETALOL HYDROCHLORIDE 10 MG: 5 INJECTION, SOLUTION INTRAVENOUS at 12:03

## 2018-03-10 RX ADMIN — LORAZEPAM 1 MG: 2 INJECTION INTRAMUSCULAR; INTRAVENOUS at 05:03

## 2018-03-10 RX ADMIN — FAMOTIDINE 20 MG: 10 INJECTION, SOLUTION INTRAVENOUS at 10:03

## 2018-03-10 NOTE — LOPA/MORA/SWTA/AOC/AEB
Thank you for this referral.  Mrs. Moeller has been ruled out for organ donation through DCD or Donation after Circulatory Death.  However, please call [facility] at [phone] if any plans are made for brain death exams prior to withdrawal of support.       Poornima CALIX

## 2018-03-10 NOTE — PLAN OF CARE
Problem: Patient Care Overview  Goal: Plan of Care Review  Outcome: Ongoing (interventions implemented as appropriate)  Family will withdraw care at 5pm

## 2018-03-10 NOTE — NURSING
Pt blood pressure 202/93. Notified Dr. Shearer. Ordered not to give labetolol since pt was bradycardic previously. Stated he will put in new orders.

## 2018-03-10 NOTE — PROGRESS NOTES
[facility] Internal Medicine Resident HO-II Progress Note    Subjective:      Ms. Moeller was seen and examined at the bedside. No events overnight. Family has made patient DNR overnight. Wishing to proceed with comfort measures only this morning with planned palliative extubation once family arrives. Nursing notified.       Objective:   Last 24 Hour Vital Signs:  BP  Min: 123/66  Max: 197/100  Temp  Av.4 °F (35.8 °C)  Min: 93.2 °F (34 °C)  Max: 97.6 °F (36.4 °C)  Pulse  Av.6  Min: 58  Max: 75  Resp  Av.6  Min: 15  Max: 33  SpO2  Av.8 %  Min: 98 %  Max: 100 %  Height  Av' 7" (170.2 cm)  Min: 5' 7" (170.2 cm)  Max: 5' 7" (170.2 cm)  Weight  Av.4 kg (208 lb 1.8 oz)  Min: 94.4 kg (208 lb 1.8 oz)  Max: 94.4 kg (208 lb 1.8 oz)  I/O last 3 completed shifts:  In: 1659.4 [I.V.:399.4; Other:400; NG/GT:60; IV Piggyback:800]  Out: 2725 [Urine:25; Drains:300; Other:2400]    Physical Examination:  General: s/p intubation; currently on 21% O2 with PEEP 5. Vt 350. , obese BMI 35  Head: normocephalic, atraumatic   Eyes: left pupil 4mm, not reactive to light- though blind before event. Right eye with pterygoid/scarred process. -Dolls eye sign  Ears, Nose, Throat:  without rhinorrhea, OP obscured by ETT placement. NG in place. No gag reflex this AM.    Neck: supple, without thyromegaly  Cardiovascular: NRRR, without murmurs or rubs, without extra heart sounds, right UE fistula with good thrill  Pulmonary: no wheeze, mechanical breath sounds  Abdomen: soft, nontender, nondistended,   Neurologic: intubated. Absent corneal reflex, absent light reflex (though blind previously), does not withdraw to pain. Absent gag.       Laboratory:  Laboratory Data Reviewed: yes  Pertinent Findings:    Recent Labs  Lab 18  1714  18  0505  18  1147 18  1920 18  0447 03/10/18  0420   WBC 17.93*  < > 9.38  --   --   --  7.69 6.50   HGB 9.2*  < > 8.9*  --   --   --  8.5* 9.4*   HCT 29.7*  < > 27.8*  -- "   --   --  26.0* 28.7*     < > 183  --   --   --  180 221   MCV 92  < > 88  --   --   --  87 86   RDW 19.4*  < > 19.1*  --   --   --  18.8* 18.6*   *  < > 134*  < > 133*  --  135*  135* 132*  132*   K 5.2*  < > 5.3*  < > 5.3*  --  4.3  4.3 4.3  4.3     < > 98  < > 98  --  96  96 94*  94*   CO2 17*  < > 21*  < > 22*  --  24  24 18*  18*   BUN 58*  < > 24*  < > 28*  --  24*  24* 38*  38*   CREATININE 9.8*  < > 4.7*  < > 5.2*  --  4.4*  4.4* 5.7*  5.7*   *  < > 159*  159*  < > 192*  192* 460* 185*  185* 201*  201*   PROT 8.3  --   --   --   --   --  7.6 7.9   ALBUMIN 3.5  --   --   --   --   --  2.7*  2.7* 2.6*  2.6*   BILITOT 0.5  --   --   --   --   --  1.1* 1.0   *  --   --   --   --   --  38 34   ALKPHOS 140*  --   --   --   --   --  102 103   *  --   --   --   --   --  39 31   < > = values in this interval not displayed.      Microbiology Data Reviewed: yes  Pertinent Findings:  Blood culture x2; NGTD  Urine culture NGTD    Other Results:  EKG (my interpretation): no new    Radiology Data Reviewed: yes  Pertinent Findings:       Current Medications:     Infusions:   heparin (porcine) in D5W 16 Units/kg/hr (03/10/18 0308)    propofol Stopped (03/07/18 0845)        Scheduled:   sodium chloride 0.9%   Intravenous Once    amLODIPine  10 mg Oral Daily    azithromycin  500 mg Intravenous Q24H    famotidine (PF)  20 mg Intravenous Daily    piperacillin-tazobactam (ZOSYN) IVPB  4.5 g Intravenous Q12H    sodium polystyrene  15 g Oral Once        PRN:  sodium chloride 0.9%, sodium chloride 0.9%, dextrose 50%, dextrose 50%, glucagon (human recombinant), glucose, glucose, heparin (PORCINE), heparin (PORCINE), insulin aspart U-100, labetalol, sodium chloride 0.9%    Antibiotics and Day Number of Therapy:  Vancomycin day 6  Zosyn day 6  Azithromycin day 5    Lines and Day Number of Therapy:  PIVx2.   Port a cath single lumen  Rectal tube - removed  Marie-  removed  NG/OG tube  ETT placed 3/5    Assessment:     Osiel Moeller is a 43 y.o.female with  Patient Active Problem List    Diagnosis Date Noted    Cardiac arrest     Encephalopathy     Acute respiratory failure with hypoxia and hypercapnia 03/05/2018    Pneumonia of both lungs due to infectious organism 12/22/2016    Type 2 diabetes mellitus with complication 12/21/2016    ESRD on dialysis     Volume overload 12/19/2016    Pneumonia 12/19/2016    Vision loss 11/29/2016    Palpitations 11/22/2016    Epigastric pain 11/16/2016    Chronic congestive heart failure 11/08/2016    Dyspnea 10/31/2016    HTN (hypertension) 10/31/2016    Diabetes 1.5, managed as type 1 10/31/2016    ESRD (end stage renal disease) 10/31/2016    Aortic sclerosis 10/31/2016    Mechanical complication of other vascular device, implant, and graft 07/17/2014        Plan:     #) Acute hypoxic and hypercapnic respiratory failure s/p cardiac arrest  - patient currently intubated; pulm following for vent management; trending ABGs for titration; currently on 21% FIO2  - likely aspiration event w/ pneumonia based on CXR and HPI  - on hepatin gtt for empiric anticoagulation per pulm concerning for PE.   - EEG suggesting moderate to severe encephalopathy; no evidence of focal cerebral dysfunction or seizures  - neurology consulted to evaluate anoxic brain injury - appreciate recommendations; poor prognosis and unlikely to regain meaningful recovery with current comorbid conditions  - per discussion with family; family would like to withdraw care this morning and proceed to comfort measures. Orders placed.      #) severe sepsis likely due to aspiration pneumonia (RUL) with acute encephalopathy  - as above; trended lactates, continuing IV abx with vancomycin and zosyn. Continue azithromycin with atypical coverage.   - CT head negative for acute process  - off sedation; spot EEG (results above); consulted neurology for  evaluation post suspect anoxic brain injury. Appreciate assistance     #) post cardiac arrest  - completed cooling protocol per pulm/crit and cardiology  - echo w/ no wall motion abnormalities, low normal EF 50-55%, grade II diastolic dysfunction, pulm HTN, increased CVP, trended troponin and EKG.  - poor prognosis; per family, plans to withdraw care today.      #) troponinemia  - frederick from 0.058 to 0.376; cardiology following, likely secondary to arrest  - EKG without ST changes     #) CKDV on HD  - MWF dialysis, nephrology consulted for HD and following; graft with good thrill on exam  - poor prognosis, HD per nephrology     #) Heart failure with preserved ejection fraciton  -  on admission, euvolemic on presentation  - will monitor; repeated echocardiogram as above.     #) DM type II  - covering with sliding scale for now     #) HTN  - labetalol PRN for SBP>180. Started amlodipine per nephrology     #) elevated transaminates  - likely elevated s/p cardiac arrest.      #) normocytic anemia  - h/h 9.2/29.7 on admission; monitoring     Diet: npo  Code: DNR after family meeting yesterday  DVTppx: on heparin gtt for anticoagulation     Dispo: continued ICU care. Critically ill. Poor prognosis. Family wishes to withdraw care today. Will plan for palliative extubation when family arrives today.        Armani Vines  [facility] Internal Medicine -II  [facility] Internal Medicine Service Team B     [facility] Medicine Hospitalist Pager numbers:   [facility] Hospitalist Medicine Team A (Sarah/Venkat): [phone]  [facility] Hospitalist Medicine Team B (Genesis/Nathalie):  [phone]

## 2018-03-10 NOTE — NURSING
Unable to get pt temp orally. Temp showing 93.2 axillary. Hr decreasing into the upper 50s. HR 58 at this time. Notified Dr. Shearer. Ordered to put pt on warming blanket. No need to get rectal temp.

## 2018-03-10 NOTE — NURSING
Patient extubated with family at the bedside with respiratory. Ativan and morphine given. Will continue to monitor patient

## 2018-03-10 NOTE — NURSING
Notified Dr. Shearer of pt remaining hypertensive after PRN labetolol. Ordered to wait one more hour. If BP is still in the 180s systolic, re-notify team.

## 2018-03-10 NOTE — NURSING
Dr. Vines at bedside to discuss code status with family. Sister, sons, and multiple other family present at bedside. Family understands and agrees to making patient a DNR. Paperwork completed by Dr. Shearer and Dr. Vines and placed in pt chart.

## 2018-03-10 NOTE — PROGRESS NOTES
"[facility] Pulmonary/Critical Care Fellow Progress Note    Subjective:      Remains intubated, no sedation and unresponsive.  Decision for comfort measures made yesterday to be instituted today per nursing.     Objective:   24-hour Vitals:  Temp:  [93.2 °F (34 °C)-97.6 °F (36.4 °C)] 96.8 °F (36 °C)  Pulse:  [58-75] 68  Resp:  [3-25] 3  SpO2:  [97 %-100 %] 99 %  BP: (101-197)/() 120/66    Physical Examination:  Vitals: /66   Pulse 68   Temp 96.8 °F (36 °C) (Axillary)   Resp (!) 3   Ht 5' 7" (1.702 m)   Wt 94.4 kg (208 lb 1.8 oz)   LMP  (LMP Unknown) Comment: 6 years ago  SpO2 99%   Breastfeeding? No   BMI 32.60 kg/m²     General: Intubated, unresponsive  HEENT: L pupil mid-fixed, ETT in place  CV: RRR, normal S1/S2, no MRG  Chest: Loud coarse rhonchi bilaterally  Abdomen: Soft, NT/ND  Extremities: No cyanosis, clubbing, edema, 2+ pulses throughout  Neuro: Unresponsive, does not withdraw to pain on my evaluation, L pupil fixed, R pupil cannot be assessed 2/2 injury, no gag reflex; does not breathe over the ventilator at baseline but with suctioning will begin to have what appear to be respiratory efforts     Laboratory:  Trended Lab Data:    Recent Labs  Lab 03/08/18  0505 03/09/18  0447 03/10/18  0420   WBC 9.38 7.69 6.50   HGB 8.9* 8.5* 9.4*   HCT 27.8* 26.0* 28.7*    180 221         Recent Labs  Lab 03/08/18  1147 03/08/18  1920 03/09/18  0447 03/10/18  0420   *  --  135*  135* 132*  132*   K 5.3*  --  4.3  4.3 4.3  4.3   CL 98  --  96  96 94*  94*   CO2 22*  --  24  24 18*  18*   BUN 28*  --  24*  24* 38*  38*   CREATININE 5.2*  --  4.4*  4.4* 5.7*  5.7*   *  192* 460* 185*  185* 201*  201*   CALCIUM 9.4  --  9.4  9.4 9.8  9.8   MG 2.2  --  1.9 2.1   PHOS 6.6*  --  6.0* 7.4*         Recent Labs  Lab 03/05/18  1714 03/09/18  0447 03/10/18  0420   PROT 8.3 7.6 7.9   ALBUMIN 3.5 2.7*  2.7* 2.6*  2.6*   BILITOT 0.5 1.1* 1.0   * 38 34   * 39 31 "   ALKPHOS 140* 102 103         Recent Labs  Lab 03/08/18  0505 03/09/18  0447 03/10/18  0420   INR 1.0 1.0 1.0       Cardiac:   Recent Labs  Lab 03/05/18  1714 03/05/18  2239 03/06/18  1308   TROPONINI 0.058* 0.376* 0.344*   *  --   --        FLP: Lab Results   Component Value Date    CHOL 157 11/08/2016    HDL 56 11/08/2016    LDLCALC 82 11/08/2016    TRIG 93 11/08/2016    CHOLHDL 2.8 11/08/2016     DM: Lab Results   Component Value Date    HGBA1C 6.1 (H) 03/06/2018    HGBA1C 7.0 (H) 12/20/2016    LDLCALC 82 11/08/2016    CREATININE 5.7 (H) 03/10/2018    CREATININE 5.7 (H) 03/10/2018     Thyroid: Lab Results   Component Value Date    TSH 0.035 (L) 12/20/2016    FREET4 1.00 12/20/2016     Anemia: Lab Results   Component Value Date    IRON 10 (L) 12/20/2016    TIBC 184 (L) 12/20/2016    FERRITIN 935 (H) 12/20/2016    RMXVENAM09 772 12/20/2016    FOLATE 4.2 12/20/2016     Urinalysis: Lab Results   Component Value Date    LABURIN No growth 03/05/2018    COLORU Yellow 03/05/2018    SPECGRAV 1.015 03/05/2018    NITRITE Negative 03/05/2018    KETONESU Negative 03/05/2018    UROBILINOGEN Negative 03/05/2018       Other Results:  Radiology:  X-ray Chest 1 View    Result Date: 3/5/2018  Cardiac arrest. Comparison: 12/21/16. Single frontal view the chest was obtained. There is a right chest single lumen Port-A-Cath the tip of which appears projected over the right chest. The patient is intubated. The endotracheal tube is above the level of the babak. The trachea is patent. The cardiac silhouette appears normal in size. There has been interval development of consolidation involving the entire right upper lung zone with ipsilateral mediastinal shift. Left lung is well-expanded and clear. There is no effusion or pneumothorax. No free air below the diaphragm. NG tube is seen with tip in the upper abdomen region. There is a stent in the right subclavian region. No acute osseous abnormality. TIPS is in the right upper  quadrant.     Interval development of consolidation in the right upper lung zone that could relate to aspiration, pneumonia, or atelectasis. Correlation and further evaluation advised. Electronically signed by: HUNTER PETERSON MD Date:     03/05/18 Time:    17:45     Ct Head Without Contrast    Result Date: 3/6/2018  CT brain without contrast. Comparison: none Technique: Multiple 5 mm axial images of the head were obtained without intravenous contrast. 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 There is no evidence for acute intracranial hemorrhage or sulcal effacement. There is slight limitation by patient motion and beam hardening specifically posterior fossa and brainstem. Ventricles normal without hydrocephalus. No midline shift or mass effect. Mild/moderate opacification of the ethmoid air cells and maxillary antra with mild probable distal thickening in the sphenoid sinus. Visualized mastoid air cells are clear.     Unremarkable noncontrast CT as detailed above specifically without evidence for acute intracranial hemorrhage or hydrocephalus. Patchy paranasal sinus disease. Further evaluation as warranted clinically. Electronically signed by: ROBB CONTRERAS DO Date:     03/06/18 Time:    10:30         Current Medications:     Infusions:   heparin (porcine) in D5W 16 Units/kg/hr (03/10/18 0308)    propofol Stopped (03/07/18 0845)        Scheduled:   sodium chloride 0.9%   Intravenous Once    amLODIPine  10 mg Oral Daily    azithromycin  500 mg Intravenous Q24H    famotidine (PF)  20 mg Intravenous Daily    piperacillin-tazobactam (ZOSYN) IVPB  4.5 g Intravenous Q12H    sodium polystyrene  15 g Oral Once        PRN:  sodium chloride 0.9%, sodium chloride 0.9%, dextrose 50%, dextrose 50%, glucagon (human recombinant), glucose, glucose, heparin (PORCINE), heparin (PORCINE), insulin aspart U-100, labetalol, sodium chloride 0.9%     Assessment:     Osiel An  [place] is a 43 y.o.female with  Patient Active Problem List    Diagnosis Date Noted    Cardiac arrest     Encephalopathy     Acute respiratory failure with hypoxia and hypercapnia 03/05/2018    Pneumonia of both lungs due to infectious organism 12/22/2016    Type 2 diabetes mellitus with complication 12/21/2016    ESRD on dialysis     Volume overload 12/19/2016    Pneumonia 12/19/2016    Vision loss 11/29/2016    Palpitations 11/22/2016    Epigastric pain 11/16/2016    Chronic congestive heart failure 11/08/2016    Dyspnea 10/31/2016    HTN (hypertension) 10/31/2016    Diabetes 1.5, managed as type 1 10/31/2016    ESRD (end stage renal disease) 10/31/2016    Aortic sclerosis 10/31/2016    Mechanical complication of other vascular device, implant, and graft 07/17/2014        Plan:     1. PEA Arrest s/p ROSC  2. Anoxic Brain Injury  3. ESRD  4. Sepsis 2/2 Possible Aspiration Pneumonia    - Plan for withdrawal of care per nursing once family arrives  - Would discontinue lab draws and unnecessary medical therapies  - Have ordered a scopolamine patch as she has significant oral secretions  - We are available to assist if needed with family discussions and end of life needs, please feel free to contact us    Ermias Bowles  [facility] Pulmonary/Critical Care Fellow

## 2018-03-10 NOTE — PROGRESS NOTES
This note also relates to the following rows which could not be included:  Oxygen Concentration (%) - Cannot attach notes to unvalidated device data  SpO2 - Cannot attach notes to unvalidated device data  Pulse - Cannot attach notes to unvalidated device data  Resp - Cannot attach notes to unvalidated device data  BP - Cannot attach notes to unvalidated device data  Vent Mode - Cannot attach notes to unvalidated device data  Set Rate - Cannot attach notes to unvalidated device data  Vt Set - Cannot attach notes to unvalidated device data  PEEP/CPAP - Cannot attach notes to unvalidated device data  Pressure Support - Cannot attach notes to unvalidated device data  Waveform - Cannot attach notes to unvalidated device data  Peak Flow - Cannot attach notes to unvalidated device data  Set Inspiratory Pressure - Cannot attach notes to unvalidated device data  Insp Time - Cannot attach notes to unvalidated device data  Plateau Set/Insp. Hold (sec) - Cannot attach notes to unvalidated device data  Trigger Sensitivity Flow/I-Trigger - Cannot attach notes to unvalidated device data  Resp Rate Total - Cannot attach notes to unvalidated device data  Peak Airway Pressure - Cannot attach notes to unvalidated device data  Mean Airway Pressure - Cannot attach notes to unvalidated device data  Plateau Pressure - Cannot attach notes to unvalidated device data  Exhaled Vt - Cannot attach notes to unvalidated device data  Total Ve - Cannot attach notes to unvalidated device data  Spont Ve - Cannot attach notes to unvalidated device data  I:E Ratio Measured - Cannot attach notes to unvalidated device data  Vt Low Alarm - Cannot attach notes to unvalidated device data  Resp Rate High Alarm - Cannot attach notes to unvalidated device data  Apnea Rate - Cannot attach notes to unvalidated device data  Apnea Volume (mL) - Cannot attach notes to unvalidated device data  Apnea Oxygen Concentration  - Cannot attach notes to unvalidated device  data  Apnea Flow Rate (L/min) - Cannot attach notes to unvalidated device data    Pt received as charted on vent flowsheet. Ambu bag and mask at bedside. All alarms on and functional with adequate volume. Cuff pressure monitored via MLT. Pt with no apprent distress noted. Will continue to  Monitor.

## 2018-03-10 NOTE — PLAN OF CARE
Problem: Patient Care Overview  Goal: Plan of Care Review  Pt resting comfortably in bed. Family at bedside. Vitals stable at this time. On warming blanket. Continuous monitoring maintained.

## 2018-03-11 PROBLEM — G93.40 ENCEPHALOPATHY: Status: ACTIVE | Noted: 2018-03-11

## 2018-03-11 PROBLEM — I46.9 CARDIAC ARREST: Status: ACTIVE | Noted: 2018-03-11

## 2018-03-11 LAB
ALBUMIN SERPL BCP-MCNC: 2.4 G/DL
ALBUMIN SERPL BCP-MCNC: 2.4 G/DL
ALP SERPL-CCNC: 89 U/L
ALT SERPL W/O P-5'-P-CCNC: 23 U/L
ANION GAP SERPL CALC-SCNC: 20 MMOL/L
ANION GAP SERPL CALC-SCNC: 20 MMOL/L
APTT BLDCRRT: 23.6 SEC
AST SERPL-CCNC: 27 U/L
BACTERIA BLD CULT: NORMAL
BACTERIA BLD CULT: NORMAL
BASOPHILS # BLD AUTO: 0.04 K/UL
BASOPHILS NFR BLD: 0.6 %
BILIRUB SERPL-MCNC: 0.6 MG/DL
BUN SERPL-MCNC: 55 MG/DL
BUN SERPL-MCNC: 55 MG/DL
CALCIUM SERPL-MCNC: 9.4 MG/DL
CALCIUM SERPL-MCNC: 9.4 MG/DL
CHLORIDE SERPL-SCNC: 96 MMOL/L
CHLORIDE SERPL-SCNC: 96 MMOL/L
CO2 SERPL-SCNC: 20 MMOL/L
CO2 SERPL-SCNC: 20 MMOL/L
CREAT SERPL-MCNC: 7.6 MG/DL
CREAT SERPL-MCNC: 7.6 MG/DL
DIFFERENTIAL METHOD: ABNORMAL
EOSINOPHIL # BLD AUTO: 0.1 K/UL
EOSINOPHIL NFR BLD: 1.9 %
ERYTHROCYTE [DISTWIDTH] IN BLOOD BY AUTOMATED COUNT: 19 %
EST. GFR  (AFRICAN AMERICAN): 7 ML/MIN/1.73 M^2
EST. GFR  (AFRICAN AMERICAN): 7 ML/MIN/1.73 M^2
EST. GFR  (NON AFRICAN AMERICAN): 6 ML/MIN/1.73 M^2
EST. GFR  (NON AFRICAN AMERICAN): 6 ML/MIN/1.73 M^2
GLUCOSE SERPL-MCNC: 175 MG/DL
GLUCOSE SERPL-MCNC: 175 MG/DL
HCT VFR BLD AUTO: 24.9 %
HGB BLD-MCNC: 8.2 G/DL
INR PPP: 1
LYMPHOCYTES # BLD AUTO: 0.9 K/UL
LYMPHOCYTES NFR BLD: 12.8 %
MAGNESIUM SERPL-MCNC: 2.4 MG/DL
MCH RBC QN AUTO: 27.8 PG
MCHC RBC AUTO-ENTMCNC: 32.9 G/DL
MCV RBC AUTO: 84 FL
MONOCYTES # BLD AUTO: 0.8 K/UL
MONOCYTES NFR BLD: 12.4 %
NEUTROPHILS # BLD AUTO: 4.9 K/UL
NEUTROPHILS NFR BLD: 71.7 %
PHOSPHATE SERPL-MCNC: 9 MG/DL
PLATELET # BLD AUTO: 212 K/UL
PMV BLD AUTO: 9.4 FL
POTASSIUM SERPL-SCNC: 4.4 MMOL/L
POTASSIUM SERPL-SCNC: 4.4 MMOL/L
PROT SERPL-MCNC: 7.2 G/DL
PROTHROMBIN TIME: 10.7 SEC
RBC # BLD AUTO: 2.95 M/UL
SODIUM SERPL-SCNC: 136 MMOL/L
SODIUM SERPL-SCNC: 136 MMOL/L
WBC # BLD AUTO: 6.78 K/UL

## 2018-03-11 PROCEDURE — 80053 COMPREHEN METABOLIC PANEL: CPT

## 2018-03-11 PROCEDURE — 85610 PROTHROMBIN TIME: CPT

## 2018-03-11 PROCEDURE — 85730 THROMBOPLASTIN TIME PARTIAL: CPT

## 2018-03-11 PROCEDURE — 94761 N-INVAS EAR/PLS OXIMETRY MLT: CPT

## 2018-03-11 PROCEDURE — 25000003 PHARM REV CODE 250: Performed by: INTERNAL MEDICINE

## 2018-03-11 PROCEDURE — 85025 COMPLETE CBC W/AUTO DIFF WBC: CPT

## 2018-03-11 PROCEDURE — 80069 RENAL FUNCTION PANEL: CPT

## 2018-03-11 PROCEDURE — 36415 COLL VENOUS BLD VENIPUNCTURE: CPT

## 2018-03-11 PROCEDURE — 83735 ASSAY OF MAGNESIUM: CPT

## 2018-03-11 PROCEDURE — 63600175 PHARM REV CODE 636 W HCPCS: Performed by: STUDENT IN AN ORGANIZED HEALTH CARE EDUCATION/TRAINING PROGRAM

## 2018-03-11 PROCEDURE — 11000001 HC ACUTE MED/SURG PRIVATE ROOM

## 2018-03-11 RX ADMIN — LABETALOL HYDROCHLORIDE 10 MG: 5 INJECTION, SOLUTION INTRAVENOUS at 09:03

## 2018-03-11 RX ADMIN — LORAZEPAM 1 MG: 2 INJECTION INTRAMUSCULAR; INTRAVENOUS at 08:03

## 2018-03-11 RX ADMIN — LABETALOL HYDROCHLORIDE 10 MG: 5 INJECTION, SOLUTION INTRAVENOUS at 08:03

## 2018-03-11 NOTE — PROGRESS NOTES
Spoke with Dr. Shearer about pt's morning labs. Since pt is on comfort care only, MD stated to hold off on any lab draws for now. Will cont to monitor.

## 2018-03-11 NOTE — PLAN OF CARE
Problem: Patient Care Overview  Goal: Plan of Care Review  Outcome: Ongoing (interventions implemented as appropriate)  Pt non-responsive, family members at bedside. No non-verbal complaints of pain noted. Pt remains NPO, NT suction per resp given for comfort care. Palliative care only, emotional support given. Right PAC accessed, dressing CDI. Cardiac monitoring placed. Safety maintained, bed alarm on - will cont to monitor.

## 2018-03-11 NOTE — PROGRESS NOTES
TN spoke with family member, Janet An who was bedside. She would like [facility] to speak with she and other family members regarding Inpatient Hospice Care.    TN called [facility] Connie, [phone], fax [phone] & [phone]. TN faxed consult and basic info on patient. Connie to contact Janet An to arrange appointment to meet with she and the rest of her family to coordinate the transition of care to hospice

## 2018-03-11 NOTE — PROGRESS NOTES
[facility] Internal Medicine Resident HO-II Progress Note    Subjective:      Ms. Moeller was seen and examined at the bedside. Patient now extubated and stepped down to floor. Patient placed on comfort care measures and DNR status per family discussion. Patient breathing spontaneously this morning. Does not react to painful, auditory stimuli. Family at bedside.      Objective:   Last 24 Hour Vital Signs:  BP  Min: 106/58  Max: 206/93  Temp  Av.8 °F (37.1 °C)  Min: 98.1 °F (36.7 °C)  Max: 99.8 °F (37.7 °C)  Pulse  Av.8  Min: 72  Max: 95  Resp  Av.8  Min: 0  Max: 34  SpO2  Av %  Min: 89 %  Max: 100 %  Weight  Av.8 kg (211 lb 3.2 oz)  Min: 95.3 kg (210 lb 1.6 oz)  Max: 96.3 kg (212 lb 4.9 oz)  I/O last 3 completed shifts:  In: 667.5 [I.V.:407.5; NG/GT:60; IV Piggyback:200]  Out: -     Physical Examination:  General: extubated; breathing spontaneously, obese BMI 35  Head: normocephalic, atraumatic   Eyes: left pupil 3mm, not reactive to light- though blind before event. Right eye with pterygoid/scarred process. -Dolls eye sign  Ears, Nose, Throat:  without rhinorrhea   Neck: supple, without thyromegaly  Cardiovascular: NRRR, without murmurs or rubs, without extra heart sounds, right UE fistula with good thrill  Pulmonary: no wheeze, mechanical breath sounds  Abdomen: soft, nontender, nondistended,   MSKL: without gross deformities; passive ROM achieved  Skin: without cyanosis, clubbing; mildly edematous hands/feet  Neurologic: Absent corneal reflex, absent light reflex (though blind previously), does not withdraw to pain.    Laboratory:  Laboratory Data Reviewed: yes  Pertinent Findings:    Recent Labs  Lab 18  0447 03/10/18  0420 18  0541   WBC 7.69 6.50 6.78   HGB 8.5* 9.4* 8.2*   HCT 26.0* 28.7* 24.9*    221 212   MCV 87 86 84   RDW 18.8* 18.6* 19.0*   *  135* 132*  132* 136  136   K 4.3  4.3 4.3  4.3 4.4  4.4   CL 96  96 94*  94* 96  96   CO2 24  24 18*  18*  20*  20*   BUN 24*  24* 38*  38* 55*  55*   CREATININE 4.4*  4.4* 5.7*  5.7* 7.6*  7.6*   *  185* 201*  201* 175*  175*   PROT 7.6 7.9 7.2   ALBUMIN 2.7*  2.7* 2.6*  2.6* 2.4*  2.4*   BILITOT 1.1* 1.0 0.6   AST 38 34 27   ALKPHOS 102 103 89   ALT 39 31 23     Microbiology Data Reviewed: yes  Pertinent Findings:  Blood culture x2; NGTD  Urine culture NGTD    Other Results:  EKG (my interpretation): NSR    Radiology Data Reviewed: yes  Pertinent Findings:     Current Medications:     Infusions:       Scheduled:   sodium chloride 0.9%   Intravenous Once    amLODIPine  10 mg Oral Daily    famotidine (PF)  20 mg Intravenous Daily    scopolamine  1 patch Transdermal Q3 Days        PRN:  sodium chloride 0.9%, sodium chloride 0.9%, dextrose 50%, dextrose 50%, glucagon (human recombinant), glucose, glucose, glycopyrrolate, insulin aspart U-100, labetalol, lorazepam, morphine, sodium chloride 0.9%    Antibiotics and Day Number of Therapy:  Vancomycin day 4  Zosyn day 4  Azithromycin day 3    Lines and Day Number of Therapy:  PIVx2.   Port a cath single lumen  Rectal tube  Marie- removed  NG/OG tube  ETT 3/5    Assessment:     Osiel Moeller is a 43 y.o.female with  Patient Active Problem List    Diagnosis Date Noted    Aspiration pneumonia of right upper lobe 03/10/2018    Cardiac arrest     Encephalopathy     Acute respiratory failure with hypoxia and hypercapnia 03/05/2018    Pneumonia of both lungs due to infectious organism 12/22/2016    Type 2 diabetes mellitus with complication 12/21/2016    ESRD on dialysis     Volume overload 12/19/2016    Pneumonia 12/19/2016    Vision loss 11/29/2016    Palpitations 11/22/2016    Epigastric pain 11/16/2016    Chronic congestive heart failure 11/08/2016    Dyspnea 10/31/2016    HTN (hypertension) 10/31/2016    Diabetes 1.5, managed as type 1 10/31/2016    ESRD (end stage renal disease) 10/31/2016    Aortic sclerosis 10/31/2016     Mechanical complication of other vascular device, implant, and graft 07/17/2014        Plan:     #) Acute hypoxic and hypercapnic respiratory failure s/p cardiac arrest  - patient currently intubated; pulm following for vent management; trending ABGs for titration; on propofol for sedation (now off), able to wean O2 overnight, currently on 21%  - likely aspiration event w/ pneumonia based on CXR and HPI  - on hepatin gtt for empiric anticoagulation per pulm concerning for PE.   - follow up pulm recs; appreciate assistance  - EEG suggesting moderate to severe encephalopathy; no evidence of focal cerebral dysfunction or seizures  - neurology consulted to evaluate anoxic brain injury - appreciate recommendations  - per family discussion, patient's family requesting DNR and placed on comfort care measures. Patient extubated and stepped down to floor. Breathing spontaneously this AM.   - Will discuss with social work placement in inpatient hospice.      #) severe sepsis likely due to aspiration pneumonia (RUL) with acute encephalopathy  - as above; trending lactates, continuing IV abx with vancomycin and zosyn. Added azithromycin with atypical coverage.   - CT head negative for acute process  - off sedation; spot EEG yesterday (results above)     #) post cardiac arrest  - completed cooling protocol per pulm/crit and cardiology  - echo w/ no wall motion abnormalities, low normal EF 50-55%, grade II diastolic dysfunction, pulm HTN, increased CVP, trended troponin and EKG.  - poor prognosis  - patient now placed on DNR (see above)     #) troponinemia  - frederick from 0.058 to 0.376; cardiology following, likely secondary to arrest  - EKG without ST changes     #) CKDV on HD  - MWF dialysis, nephrology consulted for HD and following; graft with good thrill on exam     #) Heart failure with preserved ejection fraciton  -  on admission, euvolemic on presentation     #) DM type II  - covering with sliding scale for  now     #) HTN  - restarted labetalol PRN for SBP>180. Started amlodipine per nephrology     #) elevated transaminates  - likely elevated s/p cardiac arrest.      #) normocytic anemia  - h/h 9.2/29.7 on admission     Diet: npo  Code: DNR   DVTppx: on heparin gtt for anticoagulation     Dispo: continued ICU care. Critically ill. Poor prognosis. Patient now made DNR and extubated. Placed on comfort care measures.        Sumeet Healy  [facility] Internal Medicine -II  [facility] Internal Medicine Service Team B     [facility] Medicine Hospitalist Pager numbers:   [facility] Hospitalist Medicine Team A (Sarah/Venkat): [phone]  [facility] Hospitalist Medicine Team B (Genesis/Nathalie):  [phone]

## 2018-03-11 NOTE — PLAN OF CARE
Plan of care note PGY-1:    Patient extubated today with focus on comfort care measures only.  Will transfer to floor from ICU.    Mansi Shearer MD  [facility] HO-I

## 2018-03-11 NOTE — NURSING TRANSFER
Nursing Transfer Note      3/10/2018     Transfer To: room 509    Transfer via bed    Transfer with cardiac monitoring    Transported by Candido RN    Medicines sent: novolog pen and labetalol vial     Chart send with patient: Yes    Notified: Sister at bedside to notify family    Patient reassessed at: 3/10/18 2130    Upon arrival to floor: call bell in reach and bed in lowest position. OCTAVIO Stein at bedside to receive patient. Chart and meds given to RN.

## 2018-03-11 NOTE — PROGRESS NOTES
Called to pt room per OCTAVIO Stein request to NT suction pt. Pt received on RA and was resting comfortably. BS course/ rhonchi prior to procedure. BS improved post procedure, no adverse reactions. Pt spo2 97% RA .

## 2018-03-12 VITALS
OXYGEN SATURATION: 99 % | WEIGHT: 212.31 LBS | HEART RATE: 74 BPM | DIASTOLIC BLOOD PRESSURE: 84 MMHG | RESPIRATION RATE: 20 BRPM | HEIGHT: 67 IN | SYSTOLIC BLOOD PRESSURE: 189 MMHG | TEMPERATURE: 97 F | BODY MASS INDEX: 33.32 KG/M2

## 2018-03-12 PROBLEM — Z51.5 COMFORT MEASURES ONLY STATUS: Status: ACTIVE | Noted: 2018-03-12

## 2018-03-12 PROBLEM — Z51.5 PALLIATIVE CARE ENCOUNTER: Status: ACTIVE | Noted: 2018-03-12

## 2018-03-12 PROCEDURE — 94761 N-INVAS EAR/PLS OXIMETRY MLT: CPT

## 2018-03-12 PROCEDURE — 99232 SBSQ HOSP IP/OBS MODERATE 35: CPT | Mod: GV,,, | Performed by: FAMILY MEDICINE

## 2018-03-12 PROCEDURE — 63600175 PHARM REV CODE 636 W HCPCS: Performed by: STUDENT IN AN ORGANIZED HEALTH CARE EDUCATION/TRAINING PROGRAM

## 2018-03-12 PROCEDURE — S0028 INJECTION, FAMOTIDINE, 20 MG: HCPCS | Performed by: INTERNAL MEDICINE

## 2018-03-12 PROCEDURE — 25000003 PHARM REV CODE 250: Performed by: INTERNAL MEDICINE

## 2018-03-12 RX ADMIN — LORAZEPAM 1 MG: 2 INJECTION INTRAMUSCULAR; INTRAVENOUS at 11:03

## 2018-03-12 RX ADMIN — FAMOTIDINE 20 MG: 10 INJECTION, SOLUTION INTRAVENOUS at 08:03

## 2018-03-12 NOTE — PROGRESS NOTES
Hospice liaison, Connie present at [facility].  She completed admission paper worker with family.  Connie reported patient can admit after transfer facility orders have been received and reviewed.

## 2018-03-12 NOTE — CHAPLAIN
"Patient is unresponsive and family member is in recliner very close to bed.  Family recognized me and commented "she's still hanging on".  I offered support, inquired about needs, said brief prayer.  I encouraged nurse to request a bereavement tray for the family.  "

## 2018-03-12 NOTE — PLAN OF CARE
Problem: Patient Care Overview  Goal: Plan of Care Review  Outcome: Ongoing (interventions implemented as appropriate)  Pt non-verbal and unresponsive. Sister at bedside throughout shift. Pt remains NPO, NT suction PRN per respiratory. Comfort care only, emotional support provided. Cardiac monitoring continued. Safety maintained, bed alarm on - will cont to monitor.

## 2018-03-12 NOTE — PROGRESS NOTES
Awaiting call from hospice to give report. Pt being transported to [place] via stretcher with family at side.

## 2018-03-12 NOTE — PLAN OF CARE
Problem: Patient Care Overview  Goal: Plan of Care Review  Outcome: Ongoing (interventions implemented as appropriate)  Pt on RA with sats of 97%.  Will continue to monitor.

## 2018-03-12 NOTE — DISCHARGE SUMMARY
[facility] Internal Medicine Discharge Summary    Primary Team: [facility] Internal Medicine  Attending Physician: Eddie Zelaya MD  Resident: Dr. Nilo Vines  Intern: Dr. Fred Bliss    Date of Admit: 3/5/2018  Date of Discharge: 3/12/2018    Discharge to: [facility]    Discharge Diagnoses     Patient Active Problem List   Diagnosis    Mechanical complication of other vascular device, implant, and graft    Dyspnea    HTN (hypertension)    Diabetes 1.5, managed as type 1    ESRD (end stage renal disease)    Aortic sclerosis    Chronic congestive heart failure    Epigastric pain    Palpitations    Vision loss    Volume overload    Pneumonia    ESRD on dialysis    Type 2 diabetes mellitus with complication    Pneumonia of both lungs due to infectious organism    Acute respiratory failure with hypoxia and hypercapnia    Cardiac arrest    Encephalopathy    Aspiration pneumonia of right upper lobe       Consultants and Procedures     Consultants:  Palliative Care  Pulmonology    Procedures:   none    Brief History of Present Illness      Osiel Moeller is a 43 y.o. female who  has a past medical history of Blind; CHF (congestive heart failure); Coronary artery disease; Depression; Diabetes mellitus; Heart murmur; Hypertension; Positive D dimer; and Renal failure, chronic.  The patient presented to [facility] on 3/5/2018 with a primary complaint of Cardiac Arrest  .     Osiel Moeller is a 43 year old woman with pertinent medical history of HFpEF, CKDV on HD, HTN, and DM type II who was in her usual state of health (HD Mon, Wed, Fri; independent with ADLs) until day of admission, when she was noted to attend a  of a  relative. Patient is unable to provide history due to presentation. She was scheduled for routine dialysis session on Monday, however was unable to obtain transportation due to family attending the . Per patient's sister Janet, patient was  doing well day of admission, despite missing dialysis session, and was noted to be drinking alcoholic beverages at the family gathering. Patient was last seen walking to her room at the back of the house. Per Janet, patient was found across her bed with vomit in her mouth, nonresponsive and without pulse. Family members enacted CPR at the scene and EMS arrived within 20 minutes of being called. Per report, patient was in PEA, and received 4x rounds of epinephrine, bicarb, calcium, glucose. Patient then was in V-tac and cardioversion achieved after 4th synchronized shock. Patient was intubated in field for airway protection, notable for gross vomit obtained via intubation. No other complaints on presentation. .      For the full HPI please refer to the History & Physical from this admission.    Hospital Course By Problem with Pertinent Findings     #) Acute hypoxic and hypercapnic respiratory failure s/p cardiac arrest  - patient currently intubated; pulm following for vent management; trending ABGs for titration; on propofol for sedation (now off), able to wean O2 overnight, currently on 21%  - likely aspiration event w/ pneumonia based on CXR and HPI  - on hepatin gtt for empiric anticoagulation per pulm concerning for PE.   - follow up pulm recs; appreciate assistance  - EEG suggesting moderate to severe encephalopathy; no evidence of focal cerebral dysfunction or seizures  - neurology consulted to evaluate anoxic brain injury - appreciate recommendations  - per family discussion, patient's family requesting DNR and placed on comfort care measures. Patient extubated and stepped down to floor. Breathing spontaneously this AM.   - transferring to hospice     #) severe sepsis likely due to aspiration pneumonia (RUL) with acute encephalopathy  - as above; trending lactates, continuing IV abx with vancomycin and zosyn. Added azithromycin with atypical coverage.   - CT head negative for acute process  - off sedation;  "spot EEG yesterday (results above)     #) post cardiac arrest  - completed cooling protocol per pulm/crit and cardiology  - echo w/ no wall motion abnormalities, low normal EF 50-55%, grade II diastolic dysfunction, pulm HTN, increased CVP, trended troponin and EKG.  - poor prognosis  - patient now placed on DNR (see above)     #) troponinemia  - frederick from 0.058 to 0.376; cardiology following, likely secondary to arrest  - EKG without ST changes     #) CKDV on HD  - MWF dialysis, nephrology consulted for HD and following; graft with good thrill on exam  - HD on hold in setting of comfort care/DNR     #) Heart failure with preserved ejection fraciton  -  on admission, euvolemic on presentation     #) DM type II  - covering with sliding scale for now     #) HTN  - restarted labetalol PRN for SBP>180. Started amlodipine per nephrology     #) elevated transaminates  - likely elevated s/p cardiac arrest.      #) normocytic anemia  - h/h 9.2/29.7 on admission    Discharge Medications        Medication List      STOP taking these medications    diphenoxylate-atropine 2.5-0.025 mg 2.5-0.025 mg per tablet  Commonly known as:  LOMOTIL     gabapentin 600 MG tablet  Commonly known as:  NEURONTIN     hydrOXYzine HCl 25 MG tablet  Commonly known as:  ATARAX     NIFEdipine 60 MG (OSM) 24 hr tablet  Commonly known as:  PROCARDIA-XL     ondansetron 4 MG Tbdl  Commonly known as:  ZOFRAN-ODT     promethazine 25 MG tablet  Commonly known as:  PHENERGAN     sertraline 50 MG tablet  Commonly known as:  ZOLOFT     sevelamer carbonate 800 mg Tab  Commonly known as:  RENVELA     ULTICARE 1/2 mL 30 gauge x 1/2" Syrg  Generic drug:  insulin syringe-needle U-100            Discharge Information:   Diet:  NPO    Physical Activity:  Bed bound    Instructions:  1. Take all medications as prescribed  2. Keep all follow-up appointments    Follow-Up Appointments:  Follow-up Information     [facility] On 3/12/2018.    Specialties:  Hospice " and Palliative Medicine, Physical Therapy, Occupational Therapy, Hospice Services  Why:  Hospice  Contact information:  [address]  [address]TH FLOOR  [facility]  [facility] [address]  [phone]                     Luis Antonio Bliss  [facility] Internal Medicine, -1

## 2018-03-12 NOTE — PROGRESS NOTES
faxed transfer facility orders [facility]     made phone contact with Connie and informed transfer facility orders were attached in [facility].  Connie gave permission for patient to admit and nurse to call report to [phone] at 1:35pm.     made phone contact with Elissa and arranged ambulance transportation to [facility] for  within the hour.       informed bedside nurseAbbey patient was is ready for discharge to [facility].  She can call report to number located on front of ambulance at 1:35pm.     met with family at bedside and informed them patient is ready for discharge [facility].  [facility].  All family at bedside agreed with plan.

## 2018-03-12 NOTE — PROGRESS NOTES
Thank you for this consult     Palliative Care Consult Note     S: Osiel Moeller is a 43 y.o. female admitted with Acute respiratory failure with hypoxia and hypercapnia s/p cardiac arrest. Sudden cardiac death, 20-30 minutes before ROSC, concern for anoxic brain injury given time before ROSC.    Palliative Care has been consulted for withdrawal of Interventions    B: Past Medical History:  has a past medical history of Blind; CHF (congestive heart failure); Coronary artery disease; Depression; Diabetes mellitus; Heart murmur; Hypertension; Positive D dimer; and Renal failure, chronic.   Code Status: DNR  Advanced Directives: not on file  Family/Support: yes, family by bedside    A: Patient's current condition : unresponsive.     Palliative care met with patient and family in the room. Daughter by bedside stated they have signed hospice papers, awaiting transfer to inpatient hospice care. Emotional comfort given.   Discharge Planning: discharge to inpatient hospice    R: Support offered at this time.       Recommendations:  Continue comfort measures  Code status: DNR  Family discharge option is to inpatient hospice care         Bronwyn Roberson MD, MPH  Palliative Care Team   [phone]  [facility]       > 50% of 30 min visit spent in chart review, face to face brief visit with family, symptom assessment, and emotional support.  .

## 2018-03-12 NOTE — PROGRESS NOTES
[facility] Internal Medicine Resident YARELI Progress Note    Subjective:      Ms. Moeller was seen and examined at the bedside. Patient now extubated and stepped down to floor. Patient placed on comfort care measures and DNR status per family discussion. Patient breathing spontaneously this morning. Does not react to painful, auditory stimuli. Family at bedside. Awaiting placement in hospice. Family without concerns/questions this am     Objective:   Last 24 Hour Vital Signs:  BP  Min: 127/60  Max: 200/98  Temp  Av.1 °F (36.7 °C)  Min: 97.9 °F (36.6 °C)  Max: 98.5 °F (36.9 °C)  Pulse  Av.1  Min: 76  Max: 92  Resp  Av  Min: 20  Max: 26  SpO2  Av.8 %  Min: 97 %  Max: 99 %  No intake/output data recorded.    Physical Examination:  General: breathing spontaneously, obese BMI 35, lying in bed  Head: normocephalic, atraumatic   Eyes: left pupil 3mm, not reactive to light- though blind before event. Right eye with pterygoid/scarred process. -Dolls eye sign  Ears, Nose, Throat:  without rhinorrhea   Neck: supple, without thyromegaly  Cardiovascular: NRRR, without murmurs or rubs, without extra heart sounds, right UE fistula with good thrill  Pulmonary: no wheeze, rhonchi bilaterally  Abdomen: soft, nontender, nondistended,   MSKL: without gross deformities; passive ROM achieved  Skin: without cyanosis, clubbing; mildly edematous hands/feet  Neurologic: Absent corneal reflex, absent light reflex (though blind previously), does not withdraw to pain.    Laboratory:  Laboratory Data Reviewed: yes  Pertinent Findings:    Recent Labs  Lab 18  0447 03/10/18  0420 18  0541   WBC 7.69 6.50 6.78   HGB 8.5* 9.4* 8.2*   HCT 26.0* 28.7* 24.9*    221 212   MCV 87 86 84   RDW 18.8* 18.6* 19.0*   *  135* 132*  132* 136  136   K 4.3  4.3 4.3  4.3 4.4  4.4   CL 96  96 94*  94* 96  96   CO2 24  24 18*  18* 20*  20*   BUN 24*  24* 38*  38* 55*  55*   CREATININE 4.4*  4.4* 5.7*  5.7* 7.6*   7.6*   *  185* 201*  201* 175*  175*   PROT 7.6 7.9 7.2   ALBUMIN 2.7*  2.7* 2.6*  2.6* 2.4*  2.4*   BILITOT 1.1* 1.0 0.6   AST 38 34 27   ALKPHOS 102 103 89   ALT 39 31 23     Microbiology Data Reviewed: yes  Pertinent Findings:  Blood culture x2; NGTD  Urine culture NGTD    Other Results:  EKG (my interpretation): NSR    Radiology Data Reviewed: yes  Pertinent Findings:     Current Medications:     Infusions:       Scheduled:   sodium chloride 0.9%   Intravenous Once    amLODIPine  10 mg Oral Daily    famotidine (PF)  20 mg Intravenous Daily    scopolamine  1 patch Transdermal Q3 Days        PRN:  sodium chloride 0.9%, sodium chloride 0.9%, dextrose 50%, dextrose 50%, glucagon (human recombinant), glucose, glucose, glycopyrrolate, insulin aspart U-100, labetalol, lorazepam, morphine, sodium chloride 0.9%    Antibiotics and Day Number of Therapy:  None    Assessment:     Osiel Moeller is a 43 y.o.female with  Patient Active Problem List    Diagnosis Date Noted    Cardiac arrest 03/11/2018    Encephalopathy 03/11/2018    Aspiration pneumonia of right upper lobe 03/10/2018    Acute respiratory failure with hypoxia and hypercapnia 03/05/2018    Pneumonia of both lungs due to infectious organism 12/22/2016    Type 2 diabetes mellitus with complication 12/21/2016    ESRD on dialysis     Volume overload 12/19/2016    Pneumonia 12/19/2016    Vision loss 11/29/2016    Palpitations 11/22/2016    Epigastric pain 11/16/2016    Chronic congestive heart failure 11/08/2016    Dyspnea 10/31/2016    HTN (hypertension) 10/31/2016    Diabetes 1.5, managed as type 1 10/31/2016    ESRD (end stage renal disease) 10/31/2016    Aortic sclerosis 10/31/2016    Mechanical complication of other vascular device, implant, and graft 07/17/2014        Plan:     #) Acute hypoxic and hypercapnic respiratory failure s/p cardiac arrest  - patient currently intubated; pulm following for vent  management; trending ABGs for titration; on propofol for sedation (now off), able to wean O2 overnight, currently on 21%  - likely aspiration event w/ pneumonia based on CXR and HPI  - on hepatin gtt for empiric anticoagulation per pulm concerning for PE.   - follow up pulm recs; appreciate assistance  - EEG suggesting moderate to severe encephalopathy; no evidence of focal cerebral dysfunction or seizures  - neurology consulted to evaluate anoxic brain injury - appreciate recommendations  - per family discussion, patient's family requesting DNR and placed on comfort care measures. Patient extubated and stepped down to floor. Breathing spontaneously this AM.   - pending hospice placement     #) severe sepsis likely due to aspiration pneumonia (RUL) with acute encephalopathy  - as above; trending lactates, continuing IV abx with vancomycin and zosyn. Added azithromycin with atypical coverage.   - CT head negative for acute process  - off sedation; spot EEG yesterday (results above)     #) post cardiac arrest  - completed cooling protocol per pulm/crit and cardiology  - echo w/ no wall motion abnormalities, low normal EF 50-55%, grade II diastolic dysfunction, pulm HTN, increased CVP, trended troponin and EKG.  - poor prognosis  - patient now placed on DNR (see above)     #) troponinemia  - frederick from 0.058 to 0.376; cardiology following, likely secondary to arrest  - EKG without ST changes     #) CKDV on HD  - MWF dialysis, nephrology consulted for HD and following; graft with good thrill on exam  - HD on hold in setting of comfort care/DNR     #) Heart failure with preserved ejection fraciton  -  on admission, euvolemic on presentation     #) DM type II  - covering with sliding scale for now     #) HTN  - restarted labetalol PRN for SBP>180. Started amlodipine per nephrology     #) elevated transaminates  - likely elevated s/p cardiac arrest.      #) normocytic anemia  - h/h 9.2/29.7 on admission     Diet:  npo  Code: DNR   DVTppx: on heparin gtt for anticoagulation     Dispo: Critically ill. Poor prognosis. Patient now made DNR and extubated. Placed on comfort care measures.        Luis Antonio Bliss  [facility] Internal Medicine/Pediatrics HO-I  [facility] Internal Medicine Service Team B     [facility] Medicine Hospitalist Pager numbers:   [facility] Hospitalist Medicine Team A (Sarah/Venkat): [phone]  [facility] Hospitalist Medicine Team B (Genesis/Nathalie):  [phone]

## 2018-03-12 NOTE — PROVIDER TRANSFER
"  [facility]    FACILITY TRANSFER ORDERS      Patient Name: Osiel Moellre  YOB: 1975    PCP: Moises Long MD   PCP Address: [address] / [place] [address]  PCP Phone Number: [phone]  PCP Fax: [phone]    Encounter Date: 03/12/2018    Admit to: [facility]    Vital Signs:  Routine    Diagnoses:   Active Hospital Problems    Diagnosis  POA    *Acute respiratory failure with hypoxia and hypercapnia [J96.01, J96.02]  Yes    Cardiac arrest [I46.9]  Yes    Encephalopathy [G93.40]  Yes    Aspiration pneumonia of right upper lobe [J69.0]  Yes      Resolved Hospital Problems    Diagnosis Date Resolved POA   No resolved problems to display.       Allergies:  Review of patient's allergies indicates:   Allergen Reactions    Tramadol Nausea And Vomiting       Diet: NPO    Activities: Bed rest    Nursing: none    Labs: none     CONSULTS:    none    MISCELLANEOUS CARE:  none    WOUND CARE ORDERS  None    Medications: Review discharge medications with patient and family and provide education.      Current Discharge Medication List      STOP taking these medications       diphenoxylate-atropine 2.5-0.025 mg (LOMOTIL) 2.5-0.025 mg per tablet Comments:   Reason for Stopping:         gabapentin (NEURONTIN) 600 MG tablet Comments:   Reason for Stopping:         hydrOXYzine HCl (ATARAX) 25 MG tablet Comments:   Reason for Stopping:         NIFEdipine (PROCARDIA-XL) 60 MG (OSM) 24 hr tablet Comments:   Reason for Stopping:         ondansetron (ZOFRAN-ODT) 4 MG TbDL Comments:   Reason for Stopping:         promethazine (PHENERGAN) 25 MG tablet Comments:   Reason for Stopping:         sertraline (ZOLOFT) 50 MG tablet Comments:   Reason for Stopping:         sevelamer carbonate (RENVELA) 800 mg Tab Comments:   Reason for Stopping:         ULTICARE 1/2 mL 30 gauge x 1/2" Syrg Comments:   Reason for Stopping:                Labetolol 10 mg IV q4h prn for Bp>180/110. " Hold for Hr<55    Lorazepam 1 mg IV q30 min prn moderate pain/comfort care/air hunger    Morphine 1 mg IV q10 min prn  moderate pain/comfort care/air hunger    Glycopyrrolate 0.2 mg IV TID prn secretions  _________________________________  Luis Antonio Bliss DO  03/12/2018

## 2018-03-12 NOTE — PLAN OF CARE
03/12/18 1337   Final Note   Assessment Type Final Discharge Note   Discharge Disposition [facility]ospice[facility]ic   What phone number can be called within the next 1-3 days to see how you are doing after discharge? [phone]   Hospital Follow Up  Appt(s) scheduled? Yes   Discharge plans and expectations educations in teach back method with documentation complete? Yes   Right Care Referral Info   Post Acute Recommendation Other   Referral Type (Hospice)   Facility Name ([facility])   City, [facility] ([place], [place])

## 2018-03-16 LAB
DRUGS UR: NORMAL
OTC URINE DRUG SCREEN - SUSPECT DRUG: NORMAL
OTC URINE DRUG SCREEN CHAIN OF CUSTODY: NORMAL

## 2021-06-13 NOTE — PROGRESS NOTES
Mental Health Visit Note    10/6/Pablo at the book club, Dania Andrés Reyes, family, [employer] rental car, standing at [employer], Progress Note  Nephrology      Consult Requested By: Eddie Zelaya MD  Reason for Consult: ESRD    SUBJECTIVE:     Review of Systems   Unable to perform ROS: Critical illness     Patient Active Problem List   Diagnosis    Mechanical complication of other vascular device, implant, and graft    Dyspnea    HTN (hypertension)    Diabetes 1.5, managed as type 1    ESRD (end stage renal disease)    Aortic sclerosis    Chronic congestive heart failure    Epigastric pain    Palpitations    Vision loss    Volume overload    Pneumonia    ESRD on dialysis    Type 2 diabetes mellitus with complication    Pneumonia of both lungs due to infectious organism    Acute respiratory failure with hypoxia and hypercapnia    Cardiac arrest    Encephalopathy       OBJECTIVE:     Medications:   sodium chloride 0.9%   Intravenous Once    amLODIPine  10 mg Oral Daily    azithromycin  500 mg Intravenous Q24H    famotidine (PF)  20 mg Intravenous Daily    piperacillin-tazobactam (ZOSYN) IVPB  4.5 g Intravenous Q12H    sodium polystyrene  15 g Oral Once      heparin (porcine) in D5W 16 Units/kg/hr (03/09/18 1123)    propofol Stopped (03/07/18 0845)     Vitals:    03/09/18 1330   BP: (!) 150/75   Pulse: 66   Resp: (!) 22   Temp:      I/O last 3 completed shifts:  In: 1786.8 [I.V.:586.8; Other:400; IV Piggyback:800]  Out: 2725 [Urine:25; Drains:300; Other:2400]  Physical Exam   Constitutional: No distress. She is intubated.   Morbidly obese   HENT:   Head: Normocephalic and atraumatic.   Mouth/Throat: Oropharynx is clear and moist.   Eyes: EOM are normal. No scleral icterus.   Cardiovascular: Normal rate and regular rhythm.  Exam reveals no friction rub.    No murmur heard.  Pulmonary/Chest: Effort normal and breath sounds normal. She is intubated. She has no rales.   Abdominal: Soft. Bowel sounds are normal. She exhibits no distension. There is no tenderness.   Genitourinary:   Genitourinary Comments: Marie+    Musculoskeletal: Normal range of motion. She exhibits no edema.   Lymphadenopathy:     She has no cervical adenopathy.   Neurological: GCS eye subscore is 1. GCS verbal subscore is 1. GCS motor subscore is 1.   Skin: Skin is warm and dry. No erythema.     Laboratory:    Recent Labs  Lab 03/07/18  0343 03/08/18  0505 03/09/18  0447   WBC 11.34 9.38 7.69   HGB 7.9* 8.9* 8.5*   HCT 24.5* 27.8* 26.0*    183 180   MONO 5.6  0.6 8.8  0.8 6.0  CANCELED       Recent Labs  Lab 03/08/18  0742 03/08/18  1147 03/09/18  0447   * 133* 135*  135*   K 5.2* 5.3* 4.3  4.3   CL 97 98 96  96   CO2 21* 22* 24  24   BUN 25* 28* 24*  24*   CREATININE 4.9* 5.2* 4.4*  4.4*   CALCIUM 9.7 9.4 9.4  9.4   PHOS 6.3* 6.6* 6.0*     Labs reviewed  Diagnostic Results:  X-Ray: Reviewed  US: Reviewed  Echo: Reviewed      ASSESSMENT/PLAN:   1. ESRD (N18.6 Z99.2) - usual HD on MWF with Dr. Collado in [facility]   HD done yesterday      Today discussed with sister and son severity of prognosis and unlikelihood  any meaningful neurological recovery  Son is power of , need a little more time to think  As of now no urgent indications for HD  Will hold off      2. HTN (I10) - continue Norvasc    3. Anemia of chronic kidney disease treated with CHING (N18.9 D63.1) - No epogen in the settings of uncontrolled HTN and ACS  4. MBD -   Lab Results   Component Value Date    CALCIUM 9.4 03/09/2018    CALCIUM 9.4 03/09/2018    PHOS 6.0 (H) 03/09/2018       Recent Labs  Lab 03/08/18  0742 03/08/18  1147 03/09/18  0447   MG 2.2 2.2 1.9       No results found for: [id number]  Lab Results   Component Value Date    CO2 24 03/09/2018    CO2 24 03/09/2018       Thank you for allowing me to participate in the care of your patients  With any question please call [phone]  Felisa Puga    [facility]  SHAI Collado MD, SHANE LEVY MD,   MD SCHUYLER Yarbrough, NP  [address]  [place],  [id number] [phone]